# Patient Record
Sex: MALE | Race: WHITE | Employment: OTHER | URBAN - NONMETROPOLITAN AREA
[De-identification: names, ages, dates, MRNs, and addresses within clinical notes are randomized per-mention and may not be internally consistent; named-entity substitution may affect disease eponyms.]

---

## 2018-02-24 ENCOUNTER — HOSPITAL ENCOUNTER (EMERGENCY)
Age: 68
Discharge: HOME | End: 2018-02-24
Attending: NURSE PRACTITIONER
Payer: MEDICARE

## 2018-02-24 VITALS
TEMPERATURE: 98.1 F | OXYGEN SATURATION: 98 % | RESPIRATION RATE: 16 BRPM | WEIGHT: 190 LBS | BODY MASS INDEX: 24.38 KG/M2 | DIASTOLIC BLOOD PRESSURE: 82 MMHG | SYSTOLIC BLOOD PRESSURE: 172 MMHG | HEIGHT: 74 IN | HEART RATE: 63 BPM

## 2018-02-24 DIAGNOSIS — T50.Z95A VACCINE REACTION, INITIAL ENCOUNTER: Primary | ICD-10-CM

## 2018-02-24 DIAGNOSIS — L03.114 CELLULITIS OF LEFT UPPER ARM: ICD-10-CM

## 2018-02-24 PROCEDURE — 99212 OFFICE O/P EST SF 10 MIN: CPT

## 2018-02-24 PROCEDURE — 99213 OFFICE O/P EST LOW 20 MIN: CPT | Performed by: NURSE PRACTITIONER

## 2018-02-24 RX ORDER — IBUPROFEN 200 MG
600 TABLET ORAL EVERY 6 HOURS PRN
Qty: 30 TABLET | Refills: 0 | COMMUNITY
Start: 2018-02-24 | End: 2018-03-21

## 2018-02-24 RX ORDER — CEPHALEXIN 500 MG/1
500 CAPSULE ORAL 4 TIMES DAILY
Qty: 40 CAPSULE | Refills: 0 | Status: SHIPPED | OUTPATIENT
Start: 2018-02-24 | End: 2018-03-21 | Stop reason: ALTCHOICE

## 2018-02-24 ASSESSMENT — ENCOUNTER SYMPTOMS
WHEEZING: 0
SHORTNESS OF BREATH: 0
COLOR CHANGE: 1
CHEST TIGHTNESS: 0

## 2018-02-24 ASSESSMENT — PAIN DESCRIPTION - LOCATION: LOCATION: ARM

## 2018-02-24 ASSESSMENT — PAIN SCALES - GENERAL: PAINLEVEL_OUTOF10: 8

## 2018-02-24 ASSESSMENT — PAIN DESCRIPTION - ORIENTATION: ORIENTATION: LEFT

## 2018-02-24 ASSESSMENT — PAIN DESCRIPTION - DESCRIPTORS: DESCRIPTORS: DISCOMFORT;SORE

## 2018-02-24 ASSESSMENT — PAIN DESCRIPTION - FREQUENCY: FREQUENCY: CONTINUOUS

## 2018-02-24 NOTE — ED NOTES
Discharge instructions and prescriptions reviewed with pt. Pt verbalized understanding. Pt ambulated out in stable condition. Assessment unchanged upon discharge.      Roberta Cortez, RN  02/24/18 0460

## 2018-02-24 NOTE — ED PROVIDER NOTES
file.    SOCIAL HISTORY     Patient  reports that he quit smoking about 23 years ago. He has never used smokeless tobacco.    PHYSICAL EXAM     ED TRIAGE VITALS  BP: (!) 172/82, Temp: 98.1 °F (36.7 °C), Pulse: 63, Resp: 16, SpO2: 98 %  Physical Exam   Constitutional: He is oriented to person, place, and time. He appears well-developed and well-nourished. No distress. HENT:   Head: Normocephalic and atraumatic. Eyes: Conjunctivae are normal.   Neck: Neck supple. Cardiovascular: Normal rate, regular rhythm and normal heart sounds. Exam reveals no gallop and no friction rub. No murmur heard. Pulmonary/Chest: Effort normal and breath sounds normal. No respiratory distress. He has no wheezes. Lymphadenopathy:     He has no cervical adenopathy. Neurological: He is alert and oriented to person, place, and time. Skin: Skin is warm and dry. Psychiatric: He has a normal mood and affect. His behavior is normal.   Nursing note and vitals reviewed. DIAGNOSTIC RESULTS   Labs:No results found for this visit on 02/24/18. IMAGING:    URGENT CARE COURSE:     Vitals:    02/24/18 1210   BP: (!) 172/82   Pulse: 63   Resp: 16   Temp: 98.1 °F (36.7 °C)   TempSrc: Oral   SpO2: 98%   Weight: 190 lb (86.2 kg)   Height: 6' 2\" (1.88 m)       Medications - No data to display  PROCEDURES:  None  FINAL IMPRESSION      1. Vaccine reaction, initial encounter    2. Cellulitis of left upper arm        DISPOSITION/PLAN   DISPOSITION Decision To Discharge 02/24/2018 01:04:05 PM   Patient presented with possible vaccine reaction and cellulitis of the left upper arm. I explained to him I'm going to put him on an antibiotic as a precautionary measure . Cephalexin 500 mg 4 times a day ×10 days. I also recommended he take 600 mg of ibuprofen for the inflammation and swelling. Apply warm moist compresses to the area 10-15 minutes 3-4 times a day.   Follow-up with his primary care provider if symptoms aren't improving or any other concerns.     PATIENT REFERRED TO:  MD Juan Jose [address]  [phone]    Schedule an appointment as soon as possible for a visit   As needed    DISCHARGE MEDICATIONS:  Discharge Medication List as of 2/24/2018  1:08 PM      START taking these medications    Details   ibuprofen (ADVIL;MOTRIN) 200 MG tablet Take 3 tablets by mouth every 6 hours as needed for Pain, Disp-30 tablet, R-0OTC      cephALEXin (KEFLEX) 500 MG capsule Take 1 capsule by mouth 4 times daily, Disp-40 capsule, R-0Print           Discharge Medication List as of 2/24/2018  1:08 PM          Alex Burrell, [address] [facility] [address], CNP  02/24/18 2031

## 2018-03-21 RX ORDER — SIMVASTATIN 40 MG
40 TABLET ORAL DAILY
COMMUNITY

## 2018-03-21 RX ORDER — ASCORBIC ACID 500 MG
500 TABLET ORAL DAILY
COMMUNITY
End: 2022-09-13

## 2018-03-21 RX ORDER — ASPIRIN 325 MG
325 TABLET ORAL DAILY
COMMUNITY
End: 2022-09-13

## 2018-03-21 RX ORDER — IBUPROFEN 200 MG
1 CAPSULE ORAL DAILY
COMMUNITY
End: 2022-09-13

## 2018-03-28 ENCOUNTER — ANESTHESIA (OUTPATIENT)
Dept: OPERATING ROOM | Age: 68
End: 2018-03-28
Payer: MEDICARE

## 2018-03-28 ENCOUNTER — ANESTHESIA EVENT (OUTPATIENT)
Dept: OPERATING ROOM | Age: 68
End: 2018-03-28
Payer: MEDICARE

## 2018-03-28 ENCOUNTER — HOSPITAL ENCOUNTER (OUTPATIENT)
Age: 68
Setting detail: OUTPATIENT SURGERY
Discharge: HOME | End: 2018-03-28
Attending: PODIATRIST | Admitting: PODIATRIST
Payer: MEDICARE

## 2018-03-28 VITALS
RESPIRATION RATE: 1 BRPM | SYSTOLIC BLOOD PRESSURE: 99 MMHG | DIASTOLIC BLOOD PRESSURE: 57 MMHG | OXYGEN SATURATION: 96 %

## 2018-03-28 VITALS
OXYGEN SATURATION: 96 % | HEIGHT: 75 IN | BODY MASS INDEX: 23.92 KG/M2 | RESPIRATION RATE: 13 BRPM | DIASTOLIC BLOOD PRESSURE: 79 MMHG | HEART RATE: 74 BPM | WEIGHT: 192.4 LBS | SYSTOLIC BLOOD PRESSURE: 135 MMHG | TEMPERATURE: 98.3 F

## 2018-03-28 PROCEDURE — 2500000003 HC RX 250 WO HCPCS: Performed by: PODIATRIST

## 2018-03-28 PROCEDURE — 3700000000 HC ANESTHESIA ATTENDED CARE: Performed by: PODIATRIST

## 2018-03-28 PROCEDURE — 3600000014 HC SURGERY LEVEL 4 ADDTL 15MIN: Performed by: PODIATRIST

## 2018-03-28 PROCEDURE — 2500000003 HC RX 250 WO HCPCS

## 2018-03-28 PROCEDURE — C1713 ANCHOR/SCREW BN/BN,TIS/BN: HCPCS | Performed by: PODIATRIST

## 2018-03-28 PROCEDURE — 6360000002 HC RX W HCPCS: Performed by: STUDENT IN AN ORGANIZED HEALTH CARE EDUCATION/TRAINING PROGRAM

## 2018-03-28 PROCEDURE — 7100000011 HC PHASE II RECOVERY - ADDTL 15 MIN: Performed by: PODIATRIST

## 2018-03-28 PROCEDURE — 7100000000 HC PACU RECOVERY - FIRST 15 MIN: Performed by: PODIATRIST

## 2018-03-28 PROCEDURE — 6360000002 HC RX W HCPCS

## 2018-03-28 PROCEDURE — 6370000000 HC RX 637 (ALT 250 FOR IP): Performed by: STUDENT IN AN ORGANIZED HEALTH CARE EDUCATION/TRAINING PROGRAM

## 2018-03-28 PROCEDURE — 7100000010 HC PHASE II RECOVERY - FIRST 15 MIN: Performed by: PODIATRIST

## 2018-03-28 PROCEDURE — 3700000001 HC ADD 15 MINUTES (ANESTHESIA): Performed by: PODIATRIST

## 2018-03-28 PROCEDURE — 2580000003 HC RX 258: Performed by: NURSE ANESTHETIST, CERTIFIED REGISTERED

## 2018-03-28 PROCEDURE — 3600000004 HC SURGERY LEVEL 4 BASE: Performed by: PODIATRIST

## 2018-03-28 PROCEDURE — 2500000003 HC RX 250 WO HCPCS: Performed by: NURSE ANESTHETIST, CERTIFIED REGISTERED

## 2018-03-28 PROCEDURE — 6360000002 HC RX W HCPCS: Performed by: NURSE ANESTHETIST, CERTIFIED REGISTERED

## 2018-03-28 PROCEDURE — 7100000001 HC PACU RECOVERY - ADDTL 15 MIN: Performed by: PODIATRIST

## 2018-03-28 PROCEDURE — 2720000010 HC SURG SUPPLY STERILE: Performed by: PODIATRIST

## 2018-03-28 DEVICE — CANNULATED COMPRESSION SCREW
Type: IMPLANTABLE DEVICE | Site: FOOT | Status: FUNCTIONAL
Brand: TWINFIX

## 2018-03-28 DEVICE — OSTEOSYNTHESIS COMPRESSION STAPLE
Type: IMPLANTABLE DEVICE | Site: FOOT | Status: FUNCTIONAL
Brand: EASY CLIP

## 2018-03-28 DEVICE — TWIST-OFF SCREW
Type: IMPLANTABLE DEVICE | Site: FOOT | Status: FUNCTIONAL
Brand: FIXOS

## 2018-03-28 DEVICE — K WIRE FIX L229MM DIA1.6MM S STL SGL TRCR PNT STYL SMOOTH: Type: IMPLANTABLE DEVICE | Status: FUNCTIONAL

## 2018-03-28 RX ORDER — MEPERIDINE HYDROCHLORIDE 25 MG/ML
12.5 INJECTION INTRAMUSCULAR; INTRAVENOUS; SUBCUTANEOUS EVERY 5 MIN PRN
Status: DISCONTINUED | OUTPATIENT
Start: 2018-03-28 | End: 2018-03-28 | Stop reason: HOSPADM

## 2018-03-28 RX ORDER — LIDOCAINE HYDROCHLORIDE 20 MG/ML
INJECTION, SOLUTION INFILTRATION; PERINEURAL PRN
Status: DISCONTINUED | OUTPATIENT
Start: 2018-03-28 | End: 2018-03-28 | Stop reason: SDUPTHER

## 2018-03-28 RX ORDER — ONDANSETRON 2 MG/ML
4 INJECTION INTRAMUSCULAR; INTRAVENOUS EVERY 6 HOURS PRN
Status: DISCONTINUED | OUTPATIENT
Start: 2018-03-28 | End: 2018-03-28 | Stop reason: CLARIF

## 2018-03-28 RX ORDER — MIDAZOLAM HYDROCHLORIDE 1 MG/ML
INJECTION INTRAMUSCULAR; INTRAVENOUS PRN
Status: DISCONTINUED | OUTPATIENT
Start: 2018-03-28 | End: 2018-03-28 | Stop reason: SDUPTHER

## 2018-03-28 RX ORDER — GLYCOPYRROLATE 1 MG/5 ML
SYRINGE (ML) INTRAVENOUS PRN
Status: DISCONTINUED | OUTPATIENT
Start: 2018-03-28 | End: 2018-03-28 | Stop reason: SDUPTHER

## 2018-03-28 RX ORDER — ACETAMINOPHEN 325 MG/1
650 TABLET ORAL EVERY 4 HOURS PRN
Status: DISCONTINUED | OUTPATIENT
Start: 2018-03-28 | End: 2018-03-28 | Stop reason: HOSPADM

## 2018-03-28 RX ORDER — PROPOFOL 10 MG/ML
INJECTION, EMULSION INTRAVENOUS PRN
Status: DISCONTINUED | OUTPATIENT
Start: 2018-03-28 | End: 2018-03-28 | Stop reason: SDUPTHER

## 2018-03-28 RX ORDER — FENTANYL CITRATE 50 UG/ML
50 INJECTION, SOLUTION INTRAMUSCULAR; INTRAVENOUS EVERY 5 MIN PRN
Status: DISCONTINUED | OUTPATIENT
Start: 2018-03-28 | End: 2018-03-28 | Stop reason: HOSPADM

## 2018-03-28 RX ORDER — ONDANSETRON 2 MG/ML
4 INJECTION INTRAMUSCULAR; INTRAVENOUS
Status: DISCONTINUED | OUTPATIENT
Start: 2018-03-28 | End: 2018-03-28 | Stop reason: HOSPADM

## 2018-03-28 RX ORDER — BUPIVACAINE HYDROCHLORIDE AND EPINEPHRINE 5; 5 MG/ML; UG/ML
INJECTION, SOLUTION EPIDURAL; INTRACAUDAL; PERINEURAL PRN
Status: DISCONTINUED | OUTPATIENT
Start: 2018-03-28 | End: 2018-03-28 | Stop reason: HOSPADM

## 2018-03-28 RX ORDER — OXYCODONE HYDROCHLORIDE AND ACETAMINOPHEN 5; 325 MG/1; MG/1
2 TABLET ORAL EVERY 4 HOURS PRN
Status: DISCONTINUED | OUTPATIENT
Start: 2018-03-28 | End: 2018-03-28 | Stop reason: HOSPADM

## 2018-03-28 RX ORDER — OXYCODONE HYDROCHLORIDE AND ACETAMINOPHEN 5; 325 MG/1; MG/1
1 TABLET ORAL EVERY 4 HOURS PRN
Status: DISCONTINUED | OUTPATIENT
Start: 2018-03-28 | End: 2018-03-28 | Stop reason: HOSPADM

## 2018-03-28 RX ORDER — SODIUM CHLORIDE 0.9 % (FLUSH) 0.9 %
10 SYRINGE (ML) INJECTION EVERY 12 HOURS SCHEDULED
Status: DISCONTINUED | OUTPATIENT
Start: 2018-03-28 | End: 2018-03-28 | Stop reason: HOSPADM

## 2018-03-28 RX ORDER — LABETALOL HYDROCHLORIDE 5 MG/ML
5 INJECTION, SOLUTION INTRAVENOUS EVERY 10 MIN PRN
Status: DISCONTINUED | OUTPATIENT
Start: 2018-03-28 | End: 2018-03-28 | Stop reason: HOSPADM

## 2018-03-28 RX ORDER — SODIUM CHLORIDE 0.9 % (FLUSH) 0.9 %
10 SYRINGE (ML) INJECTION PRN
Status: DISCONTINUED | OUTPATIENT
Start: 2018-03-28 | End: 2018-03-28 | Stop reason: HOSPADM

## 2018-03-28 RX ORDER — SODIUM CHLORIDE 9 MG/ML
INJECTION, SOLUTION INTRAVENOUS CONTINUOUS PRN
Status: DISCONTINUED | OUTPATIENT
Start: 2018-03-28 | End: 2018-03-28 | Stop reason: SDUPTHER

## 2018-03-28 RX ORDER — ONDANSETRON 4 MG/1
4 TABLET, ORALLY DISINTEGRATING ORAL EVERY 6 HOURS PRN
Status: DISCONTINUED | OUTPATIENT
Start: 2018-03-28 | End: 2018-03-28 | Stop reason: HOSPADM

## 2018-03-28 RX ORDER — FENTANYL CITRATE 50 UG/ML
INJECTION, SOLUTION INTRAMUSCULAR; INTRAVENOUS PRN
Status: DISCONTINUED | OUTPATIENT
Start: 2018-03-28 | End: 2018-03-28 | Stop reason: SDUPTHER

## 2018-03-28 RX ADMIN — OXYCODONE HYDROCHLORIDE AND ACETAMINOPHEN 1 TABLET: 5; 325 TABLET ORAL at 13:35

## 2018-03-28 RX ADMIN — FENTANYL CITRATE 50 MCG: 50 INJECTION INTRAMUSCULAR; INTRAVENOUS at 11:22

## 2018-03-28 RX ADMIN — LIDOCAINE HYDROCHLORIDE 60 MG: 20 INJECTION, SOLUTION INFILTRATION; PERINEURAL at 11:22

## 2018-03-28 RX ADMIN — MIDAZOLAM HYDROCHLORIDE 2 MG: 1 INJECTION, SOLUTION INTRAMUSCULAR; INTRAVENOUS at 11:20

## 2018-03-28 RX ADMIN — Medication 0.2 MG: at 11:28

## 2018-03-28 RX ADMIN — PROPOFOL 150 MG: 10 INJECTION, EMULSION INTRAVENOUS at 11:22

## 2018-03-28 RX ADMIN — PHENYLEPHRINE HYDROCHLORIDE 100 MCG: 10 INJECTION INTRAVENOUS at 12:29

## 2018-03-28 RX ADMIN — CEFAZOLIN SODIUM 2 G: 2 SOLUTION INTRAVENOUS at 11:29

## 2018-03-28 RX ADMIN — OXYCODONE HYDROCHLORIDE AND ACETAMINOPHEN 1 TABLET: 5; 325 TABLET ORAL at 14:02

## 2018-03-28 RX ADMIN — PHENYLEPHRINE HYDROCHLORIDE 100 MCG: 10 INJECTION INTRAVENOUS at 12:35

## 2018-03-28 RX ADMIN — SODIUM CHLORIDE: 9 INJECTION, SOLUTION INTRAVENOUS at 11:19

## 2018-03-28 RX ADMIN — FENTANYL CITRATE 50 MCG: 50 INJECTION INTRAMUSCULAR; INTRAVENOUS at 11:42

## 2018-03-28 ASSESSMENT — PULMONARY FUNCTION TESTS
PIF_VALUE: 4
PIF_VALUE: 3
PIF_VALUE: 2
PIF_VALUE: 9
PIF_VALUE: 3
PIF_VALUE: 4
PIF_VALUE: 2
PIF_VALUE: 4
PIF_VALUE: 3
PIF_VALUE: 5
PIF_VALUE: 3
PIF_VALUE: 4
PIF_VALUE: 14
PIF_VALUE: 5
PIF_VALUE: 15
PIF_VALUE: 4
PIF_VALUE: 3
PIF_VALUE: 3
PIF_VALUE: 4
PIF_VALUE: 4
PIF_VALUE: 5
PIF_VALUE: 4
PIF_VALUE: 3
PIF_VALUE: 2
PIF_VALUE: 1
PIF_VALUE: 3
PIF_VALUE: 3
PIF_VALUE: 4
PIF_VALUE: 4
PIF_VALUE: 3
PIF_VALUE: 4
PIF_VALUE: 10
PIF_VALUE: 4
PIF_VALUE: 2
PIF_VALUE: 4
PIF_VALUE: 2
PIF_VALUE: 4
PIF_VALUE: 3
PIF_VALUE: 4
PIF_VALUE: 4
PIF_VALUE: 5
PIF_VALUE: 8
PIF_VALUE: 4
PIF_VALUE: 1
PIF_VALUE: 5
PIF_VALUE: 3
PIF_VALUE: 4
PIF_VALUE: 4
PIF_VALUE: 5
PIF_VALUE: 0
PIF_VALUE: 3
PIF_VALUE: 19
PIF_VALUE: 4
PIF_VALUE: 3
PIF_VALUE: 1
PIF_VALUE: 4
PIF_VALUE: 0
PIF_VALUE: 6
PIF_VALUE: 11
PIF_VALUE: 3
PIF_VALUE: 7
PIF_VALUE: 3
PIF_VALUE: 5
PIF_VALUE: 4
PIF_VALUE: 4
PIF_VALUE: 3
PIF_VALUE: 16
PIF_VALUE: 3
PIF_VALUE: 6
PIF_VALUE: 4
PIF_VALUE: 3

## 2018-03-28 ASSESSMENT — PAIN - FUNCTIONAL ASSESSMENT: PAIN_FUNCTIONAL_ASSESSMENT: 0-10

## 2018-03-28 ASSESSMENT — PAIN SCALES - GENERAL
PAINLEVEL_OUTOF10: 7
PAINLEVEL_OUTOF10: 0
PAINLEVEL_OUTOF10: 7

## 2018-03-28 NOTE — ANESTHESIA PRE PROCEDURE
Department of Anesthesiology  Preprocedure Note       Name:  Torres Ruelas   Age:  79 y.o.  :  1950                                          MRN:  [id number]         Date:  3/28/2018      Surgeon: Ken Greer):  Boubacar Kiran DPM    Procedure: Procedure(s):  DISTAL CHEVRON OSTEOTOMY WITH SCREW FIXATION, AKIN OSTEOTOMY, WIEL OSTEOTOMY 2nd METATARSAL WITH SHORTENING, LEFT FOOT    Medications prior to admission:   Prior to Admission medications    Medication Sig Start Date End Date Taking? Authorizing Provider   simvastatin (ZOCOR) 40 MG tablet Take 40 mg by mouth nightly   Yes Historical Provider, MD   aspirin 325 MG tablet Take 325 mg by mouth daily    Historical Provider, MD   Cholecalciferol (VITAMIN D3) 5000 units TABS Take by mouth    Historical Provider, MD   calcium citrate (CALCITRATE) 950 MG tablet Take 1 tablet by mouth daily    Historical Provider, MD   vitamin C (ASCORBIC ACID) 500 MG tablet Take 500 mg by mouth daily    Historical Provider, MD   Omega-3 Fatty Acids (OMEGA-3 FISH OIL PO) Take 2 capsules by mouth     Historical Provider, MD   Multiple Vitamins-Minerals (MULTI COMPLETE PO) Take by mouth    Historical Provider, MD       Current medications:    Current Facility-Administered Medications   Medication Dose Route Frequency Provider Last Rate Last Dose    sodium chloride flush 0.9 % injection 10 mL  10 mL Intravenous 2 times per day Carla Vasquez DPM        sodium chloride flush 0.9 % injection 10 mL  10 mL Intravenous PRN Carla Vasquez DPM        ceFAZolin (ANCEF) 2 g in dextrose 3 % 50 mL IVPB (duplex)  2 g Intravenous On Call to DENNY Velazquez DPM           Allergies:  No Known Allergies    Problem List:  There is no problem list on file for this patient.       Past Medical History:        Diagnosis Date    Hyperlipidemia        Past Surgical History:        Procedure Laterality Date    HEMORRHOID SURGERY      NOSE SURGERY      THUMB AMPUTATION      TONSILLECTOMY

## 2018-03-28 NOTE — H&P
[address] [address]  History and Physical Update    Pt Name: Evens Laird  MRN: [id number]  YOB: 1950  Date of evaluation: 3/28/2018    [x] I have examined the patient and reviewed the H&P/Consult and there are no changes to the patient or plans.     [] I have examined the patient and reviewed the H&P/Consult and have noted the following changes:        Ramses Raymundo DPM  Electronically signed 3/28/2018 at 7:37 AM

## 2018-03-28 NOTE — ANESTHESIA POSTPROCEDURE EVALUATION
Department of Anesthesiology  Postprocedure Note    Patient: Neyda Pickens  MRN: [id number]  YOB: 1950  Date of evaluation: 3/28/2018  Time:  3:53 PM     Procedure Summary     Date:  03/28/18 Room / Location:  [facility] 01 / [facility]    Anesthesia Start:  1118 Anesthesia Stop:  1301    Procedure:  DISTAL CHEVRON OSTEOTOMY WITH SCREW FIXATION, AKIN OSTEOTOMY, WIEL OSTEOTOMY 2nd METATARSAL WITH SHORTENING, LEFT FOOT (Left ) Diagnosis:  (HALLUX VALGUS, OTHER ACQUIRED DEFORMITIES UNSPECIFIED FOOT LEFT, SUBLUXATION OF  LEFT GREAT TOE)    Surgeon:  Max Bundy DPM Responsible Provider:  Javier Gregg DO    Anesthesia Type:  general ASA Status:  2          Anesthesia Type: general    Brett Phase I: Brett Score: 9    Brett Phase II: Brett Score: 10    Last vitals: Reviewed and per EMR flowsheets.        Anesthesia Post Evaluation    Patient location during evaluation: bedside  Patient participation: complete - patient participated  Level of consciousness: awake and alert  Pain score: 2  Airway patency: patent  Nausea & Vomiting: no nausea and no vomiting  Complications: no  Cardiovascular status: hemodynamically stable and blood pressure returned to baseline  Respiratory status: spontaneous ventilation, room air and acceptable  Hydration status: stable

## 2018-03-29 NOTE — OP NOTE
osteomyelitis,  overcorrection, under correction, recurrence, possible loss of limb, and  possible loss of life. OPERATIVE PROCEDURE:  The patient was transported from the preoperative  holding area to the operating room and placed in a supine position. A  pneumatic ankle tourniquet was placed on the operative limb. The foot and  ankle were prepped and draped in a normal aseptic manner. The foot was  then exsanguinated with Esmarch and a tourniquet was inflated to 250 mmHg. Distal Chevron osteotomy of the first metatarsal head. Attention was  directed towards the dorsal medial aspect of the first metatarsophalangeal  joint. A skin marker was used to make a curvilinear incision over the  area. A 15-blade was used to make a full-thickness skin incision. Following the full thickness skin incision, blunt dissection was achieved  using a Metzenbaum scissors. The joint capsule was identified undermining  along the proximal aspect of the joint capsule was achieved using  Metzenbaum scissors. Following that, attention was directed towards the  lateral aspect of the joint along the first intermetatarsal space. Blunt  dissection was used to expose the area of surgical interest.  Vale's  provided retraction. The adductor hallucis tendon was identified and  released as well as the lateral sesamoid complex. Adequate lateral release  was noted. Attention was then directed towards the medial aspect of the  first metatarsophalangeal joint capsule. A 15-blade was used to incise the  joint capsule just medial along the course of the extensor hallucis longus  tendon. The medial aspect of the first metatarsal was then carefully  exposed and resected using a 15-blade. A K-wire was then driven from  medial to lateral of the metatarsal head centrally. Using this as the  access guide, a Chevron osteotomy was performed with its apex at the head  of the metatarsal head and a 30-degree angle.   Following the completion

## 2021-06-14 ENCOUNTER — APPOINTMENT (OUTPATIENT)
Dept: INTERVENTIONAL RADIOLOGY/VASCULAR | Age: 71
End: 2021-06-14
Payer: MEDICARE

## 2021-06-14 ENCOUNTER — HOSPITAL ENCOUNTER (EMERGENCY)
Age: 71
Discharge: HOME | End: 2021-06-14
Payer: MEDICARE

## 2021-06-14 VITALS
DIASTOLIC BLOOD PRESSURE: 87 MMHG | SYSTOLIC BLOOD PRESSURE: 187 MMHG | RESPIRATION RATE: 18 BRPM | HEART RATE: 70 BPM | TEMPERATURE: 99.1 F | OXYGEN SATURATION: 99 %

## 2021-06-14 DIAGNOSIS — M79.605 LEFT LEG PAIN: Primary | ICD-10-CM

## 2021-06-14 DIAGNOSIS — I10 ELEVATED BLOOD PRESSURE READING WITH DIAGNOSIS OF HYPERTENSION: ICD-10-CM

## 2021-06-14 PROCEDURE — 99281 EMR DPT VST MAYX REQ PHY/QHP: CPT

## 2021-06-14 PROCEDURE — 93971 EXTREMITY STUDY: CPT

## 2021-06-14 ASSESSMENT — PAIN DESCRIPTION - LOCATION: LOCATION: LEG

## 2021-06-14 ASSESSMENT — PAIN SCALES - GENERAL: PAINLEVEL_OUTOF10: 7

## 2021-06-14 ASSESSMENT — PAIN DESCRIPTION - PAIN TYPE: TYPE: ACUTE PAIN

## 2021-06-14 ASSESSMENT — ENCOUNTER SYMPTOMS
RHINORRHEA: 0
CHEST TIGHTNESS: 0
BACK PAIN: 0
COUGH: 0

## 2021-06-14 ASSESSMENT — PAIN DESCRIPTION - ORIENTATION: ORIENTATION: LOWER;LEFT

## 2021-06-14 ASSESSMENT — PAIN DESCRIPTION - DESCRIPTORS: DESCRIPTORS: DULL

## 2021-06-14 NOTE — ED PROVIDER NOTES
[address] [place] [address]       Chief Complaint   Patient presents with    Leg Pain    Leg Swelling       Nurses Notes reviewed and I agree except as noted in the HPI. HISTORY OF PRESENT ILLNESS    Saravanan Goff gini 73 y.o. male who presents to the ED for evaluation of left leg pain and swelling. Patient reports PCP sent him here to rule out DVT. Patient reports pain and swelling to the posterior medial left lower leg that began about 2 weeks ago while he was walking. He denies injury or new activity. Patient reports the pain is worse with walking, but after walking for a while, the pain improves. He walks 5- 10 miles daily. He rates the pain as 2/10 sitting and 7.5/10 when walking. Patient denies radiation of pain. Patient reports using Bengay and ice with no relief. Patient denies these symptoms previously history of blood clots, family history of blood blots, or smoking. Patient has no other complaints at this time. Pain description:  Onset: 2 weeks  Location: left lower leg  Duration: constant  Character: aching, pressure  Aggravating factors: worse with walking  Radiation: none  Timing: none  Severity: 2/10 at rest 7.5/10 walking    Experienced previously: no    HPI was provided by the patient    REVIEW OF SYSTEMS     Review of Systems   Constitutional: Negative for chills, fatigue and fever. HENT: Negative for congestion, ear discharge, ear pain, postnasal drip and rhinorrhea. Eyes: Negative for pain. Respiratory: Negative for cough and chest tightness. Cardiovascular: Positive for leg swelling (left lower). Gastrointestinal: Negative for nausea and vomiting. Genitourinary: Negative for difficulty urinating, dysuria, enuresis, flank pain and hematuria. Musculoskeletal: Negative for back pain and joint swelling. Skin: Positive for wound. Negative for rash. Neurological: Negative for dizziness, light-headedness, numbness and headaches. Psychiatric/Behavioral: Negative for agitation, behavioral problems and confusion. All other systems negative except as noted. PAST MEDICAL HISTORY     Past Medical History:   Diagnosis Date    Hyperlipidemia        SURGICALHISTORY      has a past surgical history that includes Hemorrhoid surgery; Tonsillectomy; Nose surgery; Thumb amputation; Foot surgery (Left, 2018); and pr office/outpt visit,procedure only (Left, 3/28/2018). CURRENT MEDICATIONS       Discharge Medication List as of 2021  8:01 PM      CONTINUE these medications which have NOT CHANGED    Details   aspirin 325 MG tablet Take 325 mg by mouth dailyHistorical Med      simvastatin (ZOCOR) 40 MG tablet Take 40 mg by mouth nightlyHistorical Med      Cholecalciferol (VITAMIN D3) 5000 units TABS Take by mouthHistorical Med      calcium citrate (CALCITRATE) 950 MG tablet Take 1 tablet by mouth dailyHistorical Med      vitamin C (ASCORBIC ACID) 500 MG tablet Take 500 mg by mouth dailyHistorical Med      Omega-3 Fatty Acids (OMEGA-3 FISH OIL PO) Take 2 capsules by mouth Historical Med      Multiple Vitamins-Minerals (MULTI COMPLETE PO) Take by mouthHistorical Med             ALLERGIES     has No Known Allergies. FAMILY HISTORY     He indicated that his mother is alive. He indicated that his father is . He indicated that his brother is . family history includes Early Death (age of onset: 39) in his brother; Early Death (age of onset: 62) in his father; Heart Attack in his brother and father; Stroke in his mother.     SOCIAL HISTORY       Social History     Socioeconomic History    Marital status:      Spouse name: Not on file    Number of children: Not on file    Years of education: Not on file    Highest education level: Not on file   Occupational History    Not on file   Tobacco Use    Smoking status: Former Smoker     Quit date: 1994     Years since quittin.9    Smokeless tobacco: Never Used   Substance and Sexual Activity    Alcohol use: Yes     Alcohol/week: 3.0 standard drinks     Types: 3 Glasses of wine per week    Drug use: Not on file    Sexual activity: Not on file   Other Topics Concern    Not on file   Social History Narrative    Not on file     Social Determinants of Health     Financial Resource Strain:     Difficulty of Paying Living Expenses:    Food Insecurity:     Worried About Running Out of Food in the Last Year:     [address] in the Last Year:    Transportation Needs:     Lack of Transportation (Medical):  Lack of Transportation (Non-Medical):    Physical Activity:     Days of Exercise per Week:     Minutes of Exercise per Session:    Stress:     Feeling of Stress :    Social Connections:     Frequency of Communication with Friends and Family:     Frequency of Social Gatherings with Friends and Family:     Attends Synagogue Services:     Active Member of Clubs or Organizations:     Attends Club or Organization Meetings:     Marital Status:    Intimate Partner Violence:     Fear of Current or Ex-Partner:     Emotionally Abused:     Physically Abused:     Sexually Abused:        PHYSICAL EXAM     INITIAL VITALS:  oral temperature is 99.1 °F (37.3 °C). His blood pressure is 187/87 (abnormal) and his pulse is 70. His respiration is 18 and oxygen saturation is 99%. Physical Exam  Vitals and nursing note reviewed. Constitutional:       General: He is not in acute distress. Appearance: He is well-developed. He is not diaphoretic. HENT:      Head: Normocephalic and atraumatic. Nose: Nose normal.      Mouth/Throat:      Mouth: Mucous membranes are moist.      Pharynx: Oropharynx is clear. Eyes:      General:         Right eye: No discharge. Left eye: No discharge. Conjunctiva/sclera: Conjunctivae normal.   Neck:      Trachea: No tracheal deviation. Cardiovascular:      Rate and Rhythm: Normal rate and regular rhythm. Heart sounds: Normal heart sounds. No murmur heard. No gallop. Comments: Normal capillary refill  Pulmonary:      Effort: Pulmonary effort is normal. No respiratory distress. Breath sounds: Normal breath sounds. No stridor. Abdominal:      General: Bowel sounds are normal.      Palpations: Abdomen is soft. Musculoskeletal:         General: No deformity. Normal range of motion. Cervical back: Normal range of motion. Right lower leg: Normal.      Left lower leg: Swelling (with nodule to medial aspect. No erythema) and tenderness (medial/ posterior) present. No deformity, lacerations or bony tenderness. No edema. Skin:     General: Skin is warm and dry. Capillary Refill: Capillary refill takes less than 2 seconds. Coloration: Skin is not pale. Findings: No erythema or rash. Neurological:      General: No focal deficit present. Mental Status: He is alert and oriented to person, place, and time. Cranial Nerves: No cranial nerve deficit. Psychiatric:         Mood and Affect: Mood normal.         Behavior: Behavior normal.         DIFFERENTIAL DIAGNOSIS:   DVT, superficial thrombophlebitis, phlebitis, strain    DIAGNOSTIC RESULTS     EKG: All EKG's are interpreted by the Emergency Department Physician who eithersigns or Co-signs this chart in the absence of a cardiologist.    None    RADIOLOGY: non-plainfilm images(s) such as CT, Ultrasound and MRI are read by the radiologist.  Plain radiographic images are visualized and preliminarily interpreted by the emergency physician unless otherwise stated below. VL DUP LOWER EXTREMITY VENOUS LEFT   Final Result   Impression:   No DVT within the veins of the left lower extremity. This document has been electronically signed by: Fior Franklin MD on    06/14/2021 07:26 PM      Technique Used: Duplex examination performed utilizing grayscale, color    and spectral analysis.             LABS:   Labs Reviewed - No data to display    EMERGENCY DEPARTMENT COURSE:   Vitals:    Vitals:    06/14/21 1800 06/14/21 1803 06/14/21 2001   BP:  (!) 189/90 (!) 187/87   Pulse: 70     Resp: 18     Temp: 99.1 °F (37.3 °C)     TempSrc: Oral     SpO2: 99%         [facility]      Patient was seen and evaluated in the emergency department, patient appeared to be in stable condition. Vital signs assessed, no abnormality noted. Physical exam completed. Tenderness to the posterior. Medial left lower leg with swelling and palpable nodule noted. VL DUP Ordered. Based on my physical exam and work up I believe the patient has leg pain not due to DVT. I discussed my findings and plan of care with patient. They are amenable with discharge home. While here in the emergency department they maintained a stable course and were appropriate for discharge. Medications - No data to display      Patient was seen independently by myself. The patient's final impression and disposition and plan was determined by myself. Strict return precautions and follow up instructions were discussed with the patient prior to discharge, with which the patient agrees. Physical assessment findings, diagnostic testing(s) if applicable, and vital signs reviewed with patient/patient representative. Questions answered. Medications asdirected, including OTC medications for supportive care. Education provided on medications. Differential diagnosis(s) discussed with patient/patient representative. Home care/self care instructions reviewed withpatient/patient representative. Patient is to follow-up with family care provider in 2-3 days if no improvement. Patient is to go to the emergency department if symptoms worsen. Patient/patient representative isaware of care plan, questions answered, verbalizes understanding and is in agreement. CRITICAL CARE:   None    CONSULTS:  None    PROCEDURES:  None    FINAL IMPRESSION     1. Left leg pain    2.

## 2021-06-14 NOTE — ED NOTES
Pt comes in through [facility]. 2 weeks ago while walking he began experiencing left calf pain. He then started having swelling in the area.  He states if he is sitting the pain is 3/10, but when walking it is 7/10     Em Proctor RN  06/14/21 3662

## 2021-06-24 ASSESSMENT — ENCOUNTER SYMPTOMS
VOMITING: 0
NAUSEA: 0
EYE PAIN: 0

## 2022-07-02 ENCOUNTER — HOSPITAL ENCOUNTER (EMERGENCY)
Age: 72
Discharge: HOME | End: 2022-07-02
Payer: MEDICARE

## 2022-07-02 ENCOUNTER — APPOINTMENT (OUTPATIENT)
Dept: GENERAL RADIOLOGY | Age: 72
End: 2022-07-02
Payer: MEDICARE

## 2022-07-02 VITALS
WEIGHT: 195 LBS | TEMPERATURE: 96.9 F | HEIGHT: 74 IN | DIASTOLIC BLOOD PRESSURE: 73 MMHG | RESPIRATION RATE: 18 BRPM | BODY MASS INDEX: 25.03 KG/M2 | HEART RATE: 71 BPM | SYSTOLIC BLOOD PRESSURE: 156 MMHG | OXYGEN SATURATION: 95 %

## 2022-07-02 DIAGNOSIS — M25.571 ACUTE RIGHT ANKLE PAIN: Primary | ICD-10-CM

## 2022-07-02 PROCEDURE — 99213 OFFICE O/P EST LOW 20 MIN: CPT | Performed by: NURSE PRACTITIONER

## 2022-07-02 PROCEDURE — 73610 X-RAY EXAM OF ANKLE: CPT

## 2022-07-02 PROCEDURE — 99213 OFFICE O/P EST LOW 20 MIN: CPT

## 2022-07-02 RX ORDER — LISINOPRIL 10 MG/1
10 TABLET ORAL DAILY
COMMUNITY

## 2022-07-02 RX ORDER — ACETAMINOPHEN 325 MG/1
650 TABLET ORAL EVERY 6 HOURS PRN
Qty: 120 TABLET | Refills: 3 | COMMUNITY
Start: 2022-07-02

## 2022-07-02 ASSESSMENT — PAIN DESCRIPTION - ORIENTATION: ORIENTATION: RIGHT

## 2022-07-02 ASSESSMENT — PAIN SCALES - GENERAL: PAINLEVEL_OUTOF10: 3

## 2022-07-02 ASSESSMENT — PAIN DESCRIPTION - LOCATION: LOCATION: ANKLE

## 2022-07-02 ASSESSMENT — PAIN DESCRIPTION - FREQUENCY: FREQUENCY: INTERMITTENT

## 2022-07-02 ASSESSMENT — PAIN DESCRIPTION - PAIN TYPE: TYPE: ACUTE PAIN

## 2022-07-02 ASSESSMENT — PAIN DESCRIPTION - ONSET: ONSET: SUDDEN

## 2022-07-02 ASSESSMENT — PAIN - FUNCTIONAL ASSESSMENT
PAIN_FUNCTIONAL_ASSESSMENT: 0-10
PAIN_FUNCTIONAL_ASSESSMENT: PREVENTS OR INTERFERES SOME ACTIVE ACTIVITIES AND ADLS

## 2022-07-02 ASSESSMENT — PAIN DESCRIPTION - DESCRIPTORS: DESCRIPTORS: STABBING

## 2022-07-02 NOTE — ED NOTES
Pt's right ankle wrapped in 4\" ace bandage. No change in patients condition. Discharge instructions and OTC pain medications discussed with pt. Pt. Verbalized understanding of info given and ambulated to exit in stable condition.       Gael Colon RN  07/02/22 7545
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
## 2022-09-06 ENCOUNTER — HOSPITAL ENCOUNTER (OUTPATIENT)
Age: 72
Discharge: HOME | End: 2022-09-06
Payer: MEDICARE

## 2022-09-06 LAB
EKG ATRIAL RATE: 54 BPM
EKG P AXIS: 54 DEGREES
EKG P-R INTERVAL: 176 MS
EKG Q-T INTERVAL: 398 MS
EKG QRS DURATION: 108 MS
EKG QTC CALCULATION (BAZETT): 377 MS
EKG R AXIS: -51 DEGREES
EKG T AXIS: 36 DEGREES
EKG VENTRICULAR RATE: 54 BPM
ERYTHROCYTE [DISTWIDTH] IN BLOOD BY AUTOMATED COUNT: 14 % (ref 11.5–14.5)
ERYTHROCYTE [DISTWIDTH] IN BLOOD BY AUTOMATED COUNT: 47.8 FL (ref 35–45)
HCT VFR BLD CALC: 50.7 % (ref 42–52)
HEMOGLOBIN: 16.2 GM/DL (ref 14–18)
MCH RBC QN AUTO: 29.6 PG (ref 26–33)
MCHC RBC AUTO-ENTMCNC: 32 GM/DL (ref 32.2–35.5)
MCV RBC AUTO: 92.7 FL (ref 80–94)
PLATELET # BLD: 191 THOU/MM3 (ref 130–400)
PMV BLD AUTO: 10.2 FL (ref 9.4–12.4)
RBC # BLD: 5.47 MILL/MM3 (ref 4.7–6.1)
WBC # BLD: 6 THOU/MM3 (ref 4.8–10.8)

## 2022-09-06 PROCEDURE — 93010 ELECTROCARDIOGRAM REPORT: CPT | Performed by: INTERNAL MEDICINE

## 2022-09-06 PROCEDURE — 85027 COMPLETE CBC AUTOMATED: CPT

## 2022-09-06 PROCEDURE — 80053 COMPREHEN METABOLIC PANEL: CPT

## 2022-09-06 PROCEDURE — 36415 COLL VENOUS BLD VENIPUNCTURE: CPT

## 2022-09-06 PROCEDURE — 93005 ELECTROCARDIOGRAM TRACING: CPT | Performed by: PODIATRIST

## 2022-09-07 LAB
ALBUMIN SERPL-MCNC: 4.5 G/DL (ref 3.5–5.1)
ALP BLD-CCNC: 55 U/L (ref 38–126)
ALT SERPL-CCNC: 19 U/L (ref 11–66)
ANION GAP SERPL CALCULATED.3IONS-SCNC: 10 MEQ/L (ref 8–16)
AST SERPL-CCNC: 20 U/L (ref 5–40)
BILIRUB SERPL-MCNC: 0.9 MG/DL (ref 0.3–1.2)
BUN BLDV-MCNC: 25 MG/DL (ref 7–22)
CALCIUM SERPL-MCNC: 9.3 MG/DL (ref 8.5–10.5)
CHLORIDE BLD-SCNC: 104 MEQ/L (ref 98–111)
CO2: 26 MEQ/L (ref 23–33)
CREAT SERPL-MCNC: 1.4 MG/DL (ref 0.4–1.2)
GFR SERPL CREATININE-BSD FRML MDRD: 50 ML/MIN/1.73M2
GLUCOSE BLD-MCNC: 88 MG/DL (ref 70–108)
POTASSIUM SERPL-SCNC: 5.1 MEQ/L (ref 3.5–5.2)
SODIUM BLD-SCNC: 140 MEQ/L (ref 135–145)
TOTAL PROTEIN: 6.6 G/DL (ref 6.1–8)

## 2022-09-08 NOTE — PROGRESS NOTES
EKG 9/6/22 given to anesthesia for review.  Per Dr. Macrina Cardoso ok to proceed at the surgery center 9/21 without further intervention

## 2022-09-13 RX ORDER — ASPIRIN 81 MG/1
81 TABLET ORAL DAILY
COMMUNITY

## 2022-09-13 RX ORDER — VITAMIN B COMPLEX
1 CAPSULE ORAL DAILY
COMMUNITY

## 2022-09-21 ENCOUNTER — ANESTHESIA (OUTPATIENT)
Dept: OPERATING ROOM | Age: 72
End: 2022-09-21
Payer: MEDICARE

## 2022-09-21 ENCOUNTER — ANESTHESIA EVENT (OUTPATIENT)
Dept: OPERATING ROOM | Age: 72
End: 2022-09-21
Payer: MEDICARE

## 2022-09-21 ENCOUNTER — HOSPITAL ENCOUNTER (OUTPATIENT)
Age: 72
Setting detail: OUTPATIENT SURGERY
Discharge: HOME | End: 2022-09-21
Attending: PODIATRIST | Admitting: PODIATRIST
Payer: MEDICARE

## 2022-09-21 VITALS
BODY MASS INDEX: 24.25 KG/M2 | HEART RATE: 60 BPM | OXYGEN SATURATION: 95 % | WEIGHT: 195 LBS | SYSTOLIC BLOOD PRESSURE: 122 MMHG | RESPIRATION RATE: 16 BRPM | DIASTOLIC BLOOD PRESSURE: 56 MMHG | HEIGHT: 75 IN | TEMPERATURE: 96.5 F

## 2022-09-21 PROCEDURE — 2720000010 HC SURG SUPPLY STERILE: Performed by: PODIATRIST

## 2022-09-21 PROCEDURE — 7100000000 HC PACU RECOVERY - FIRST 15 MIN: Performed by: PODIATRIST

## 2022-09-21 PROCEDURE — 3600000003 HC SURGERY LEVEL 3 BASE: Performed by: PODIATRIST

## 2022-09-21 PROCEDURE — C1713 ANCHOR/SCREW BN/BN,TIS/BN: HCPCS | Performed by: PODIATRIST

## 2022-09-21 PROCEDURE — 7100000011 HC PHASE II RECOVERY - ADDTL 15 MIN: Performed by: PODIATRIST

## 2022-09-21 PROCEDURE — 6360000002 HC RX W HCPCS: Performed by: NURSE ANESTHETIST, CERTIFIED REGISTERED

## 2022-09-21 PROCEDURE — 2709999900 HC NON-CHARGEABLE SUPPLY: Performed by: PODIATRIST

## 2022-09-21 PROCEDURE — 3700000000 HC ANESTHESIA ATTENDED CARE: Performed by: PODIATRIST

## 2022-09-21 PROCEDURE — 2500000003 HC RX 250 WO HCPCS: Performed by: NURSE ANESTHETIST, CERTIFIED REGISTERED

## 2022-09-21 PROCEDURE — 3600000013 HC SURGERY LEVEL 3 ADDTL 15MIN: Performed by: PODIATRIST

## 2022-09-21 PROCEDURE — 7100000001 HC PACU RECOVERY - ADDTL 15 MIN: Performed by: PODIATRIST

## 2022-09-21 PROCEDURE — 2500000003 HC RX 250 WO HCPCS: Performed by: PODIATRIST

## 2022-09-21 PROCEDURE — 6370000000 HC RX 637 (ALT 250 FOR IP): Performed by: PODIATRIST

## 2022-09-21 PROCEDURE — 2580000003 HC RX 258: Performed by: NURSE ANESTHETIST, CERTIFIED REGISTERED

## 2022-09-21 PROCEDURE — 6360000002 HC RX W HCPCS: Performed by: ANESTHESIOLOGY

## 2022-09-21 PROCEDURE — 3700000001 HC ADD 15 MINUTES (ANESTHESIA): Performed by: PODIATRIST

## 2022-09-21 PROCEDURE — 7100000010 HC PHASE II RECOVERY - FIRST 15 MIN: Performed by: PODIATRIST

## 2022-09-21 DEVICE — STAPLE INT W11XH10MM WIRE 1.5X1.5MM HND WRST NIT SPD CONT: Type: IMPLANTABLE DEVICE | Site: FOOT | Status: FUNCTIONAL

## 2022-09-21 DEVICE — KIT DRL PIN L100MM DIA2MM ABSRB W/ MTL TIP K WIRE GUID SL: Type: IMPLANTABLE DEVICE | Site: FOOT | Status: FUNCTIONAL

## 2022-09-21 RX ORDER — LIDOCAINE HYDROCHLORIDE 20 MG/ML
INJECTION, SOLUTION EPIDURAL; INFILTRATION; INTRACAUDAL; PERINEURAL PRN
Status: DISCONTINUED | OUTPATIENT
Start: 2022-09-21 | End: 2022-09-21 | Stop reason: SDUPTHER

## 2022-09-21 RX ORDER — SODIUM CHLORIDE 0.9 % (FLUSH) 0.9 %
5-40 SYRINGE (ML) INJECTION EVERY 12 HOURS SCHEDULED
Status: DISCONTINUED | OUTPATIENT
Start: 2022-09-21 | End: 2022-09-21 | Stop reason: SDUPTHER

## 2022-09-21 RX ORDER — SODIUM CHLORIDE 0.9 % (FLUSH) 0.9 %
5-40 SYRINGE (ML) INJECTION PRN
Status: DISCONTINUED | OUTPATIENT
Start: 2022-09-21 | End: 2022-09-21 | Stop reason: HOSPADM

## 2022-09-21 RX ORDER — BUPIVACAINE HYDROCHLORIDE 2.5 MG/ML
INJECTION, SOLUTION EPIDURAL; INFILTRATION; INTRACAUDAL PRN
Status: DISCONTINUED | OUTPATIENT
Start: 2022-09-21 | End: 2022-09-21 | Stop reason: ALTCHOICE

## 2022-09-21 RX ORDER — SODIUM CHLORIDE 9 MG/ML
INJECTION, SOLUTION INTRAVENOUS PRN
Status: DISCONTINUED | OUTPATIENT
Start: 2022-09-21 | End: 2022-09-21 | Stop reason: HOSPADM

## 2022-09-21 RX ORDER — SODIUM CHLORIDE 9 MG/ML
INJECTION, SOLUTION INTRAVENOUS CONTINUOUS
Status: DISCONTINUED | OUTPATIENT
Start: 2022-09-21 | End: 2022-09-21 | Stop reason: HOSPADM

## 2022-09-21 RX ORDER — CEFAZOLIN SODIUM 1 G/3ML
INJECTION, POWDER, FOR SOLUTION INTRAMUSCULAR; INTRAVENOUS PRN
Status: DISCONTINUED | OUTPATIENT
Start: 2022-09-21 | End: 2022-09-21 | Stop reason: SDUPTHER

## 2022-09-21 RX ORDER — PROPOFOL 10 MG/ML
INJECTION, EMULSION INTRAVENOUS PRN
Status: DISCONTINUED | OUTPATIENT
Start: 2022-09-21 | End: 2022-09-21 | Stop reason: SDUPTHER

## 2022-09-21 RX ORDER — LIDOCAINE HYDROCHLORIDE AND EPINEPHRINE BITARTRATE 20; .01 MG/ML; MG/ML
INJECTION, SOLUTION SUBCUTANEOUS PRN
Status: DISCONTINUED | OUTPATIENT
Start: 2022-09-21 | End: 2022-09-21 | Stop reason: ALTCHOICE

## 2022-09-21 RX ORDER — EPHEDRINE SULFATE 50 MG/ML
INJECTION INTRAVENOUS PRN
Status: DISCONTINUED | OUTPATIENT
Start: 2022-09-21 | End: 2022-09-21 | Stop reason: SDUPTHER

## 2022-09-21 RX ORDER — DEXAMETHASONE SODIUM PHOSPHATE 10 MG/ML
INJECTION, EMULSION INTRAMUSCULAR; INTRAVENOUS PRN
Status: DISCONTINUED | OUTPATIENT
Start: 2022-09-21 | End: 2022-09-21 | Stop reason: SDUPTHER

## 2022-09-21 RX ORDER — SODIUM CHLORIDE 0.9 % (FLUSH) 0.9 %
5-40 SYRINGE (ML) INJECTION EVERY 12 HOURS SCHEDULED
Status: DISCONTINUED | OUTPATIENT
Start: 2022-09-21 | End: 2022-09-21 | Stop reason: HOSPADM

## 2022-09-21 RX ORDER — OXYCODONE HYDROCHLORIDE 5 MG/1
10 TABLET ORAL EVERY 4 HOURS PRN
Status: DISCONTINUED | OUTPATIENT
Start: 2022-09-21 | End: 2022-09-21 | Stop reason: HOSPADM

## 2022-09-21 RX ORDER — SODIUM CHLORIDE 9 MG/ML
INJECTION, SOLUTION INTRAVENOUS CONTINUOUS PRN
Status: DISCONTINUED | OUTPATIENT
Start: 2022-09-21 | End: 2022-09-21 | Stop reason: SDUPTHER

## 2022-09-21 RX ORDER — MORPHINE SULFATE 2 MG/ML
2 INJECTION, SOLUTION INTRAMUSCULAR; INTRAVENOUS
Status: DISCONTINUED | OUTPATIENT
Start: 2022-09-21 | End: 2022-09-21 | Stop reason: HOSPADM

## 2022-09-21 RX ORDER — OXYCODONE HYDROCHLORIDE 5 MG/1
5 TABLET ORAL EVERY 4 HOURS PRN
Status: DISCONTINUED | OUTPATIENT
Start: 2022-09-21 | End: 2022-09-21 | Stop reason: HOSPADM

## 2022-09-21 RX ORDER — FENTANYL CITRATE 50 UG/ML
INJECTION, SOLUTION INTRAMUSCULAR; INTRAVENOUS PRN
Status: DISCONTINUED | OUTPATIENT
Start: 2022-09-21 | End: 2022-09-21 | Stop reason: SDUPTHER

## 2022-09-21 RX ORDER — MORPHINE SULFATE 2 MG/ML
4 INJECTION, SOLUTION INTRAMUSCULAR; INTRAVENOUS
Status: DISCONTINUED | OUTPATIENT
Start: 2022-09-21 | End: 2022-09-21 | Stop reason: HOSPADM

## 2022-09-21 RX ORDER — ONDANSETRON 2 MG/ML
INJECTION INTRAMUSCULAR; INTRAVENOUS PRN
Status: DISCONTINUED | OUTPATIENT
Start: 2022-09-21 | End: 2022-09-21 | Stop reason: SDUPTHER

## 2022-09-21 RX ORDER — SODIUM CHLORIDE 0.9 % (FLUSH) 0.9 %
5-40 SYRINGE (ML) INJECTION PRN
Status: DISCONTINUED | OUTPATIENT
Start: 2022-09-21 | End: 2022-09-21 | Stop reason: SDUPTHER

## 2022-09-21 RX ADMIN — ONDANSETRON 4 MG: 2 INJECTION INTRAMUSCULAR; INTRAVENOUS at 11:30

## 2022-09-21 RX ADMIN — EPHEDRINE SULFATE 15 MG: 50 INJECTION, SOLUTION INTRAVENOUS at 10:25

## 2022-09-21 RX ADMIN — CEFAZOLIN 2000 MG: 1 INJECTION, POWDER, FOR SOLUTION INTRAMUSCULAR; INTRAVENOUS at 09:54

## 2022-09-21 RX ADMIN — PROPOFOL 150 MG: 10 INJECTION, EMULSION INTRAVENOUS at 09:51

## 2022-09-21 RX ADMIN — FENTANYL CITRATE 100 MCG: 50 INJECTION, SOLUTION INTRAMUSCULAR; INTRAVENOUS at 09:51

## 2022-09-21 RX ADMIN — SODIUM CHLORIDE: 9 INJECTION, SOLUTION INTRAVENOUS at 09:48

## 2022-09-21 RX ADMIN — LIDOCAINE HYDROCHLORIDE 80 MG: 20 INJECTION, SOLUTION EPIDURAL; INFILTRATION; INTRACAUDAL; PERINEURAL at 09:51

## 2022-09-21 RX ADMIN — EPHEDRINE SULFATE 15 MG: 50 INJECTION, SOLUTION INTRAVENOUS at 10:01

## 2022-09-21 RX ADMIN — OXYCODONE 5 MG: 5 TABLET ORAL at 12:17

## 2022-09-21 RX ADMIN — DEXAMETHASONE SODIUM PHOSPHATE 8 MG: 10 INJECTION, EMULSION INTRAMUSCULAR; INTRAVENOUS at 09:55

## 2022-09-21 ASSESSMENT — PAIN SCALES - GENERAL
PAINLEVEL_OUTOF10: 5
PAINLEVEL_OUTOF10: 5

## 2022-09-21 ASSESSMENT — PAIN DESCRIPTION - ORIENTATION: ORIENTATION: LEFT

## 2022-09-21 ASSESSMENT — PAIN - FUNCTIONAL ASSESSMENT: PAIN_FUNCTIONAL_ASSESSMENT: 0-10

## 2022-09-21 ASSESSMENT — PAIN DESCRIPTION - LOCATION: LOCATION: FOOT

## 2022-09-21 ASSESSMENT — PAIN DESCRIPTION - DESCRIPTORS: DESCRIPTORS: SHARP

## 2022-09-21 NOTE — DISCHARGE INSTRUCTIONS
Post Op Instructions    Pt Name: [facility] Record Number: [id number]  Today's Date: 9/21/2022    GENERAL ANESTHESIA OR SEDATION  1. Do not drive or operate hazardous machinery for 24 hours. 2. Do not make important business or personal decisions for 24 hours. 3. Do not drink alcoholic beverages or use tobacco for 24 hours. ACTIVITY INSTRUCTIONS:  [] Rest today. Resume light to normal activity tomorrow. [x] You may ambulate as tolerated in your post op shoe/boot. [] No weight is to be placed on the operative limb. Use crutches, walker, Roll-a-bout as needed. [x]Elevate the operative limb as much as possible to reduce swelling and discomfort for the first 72 hours      DIET INSTRUCTIONS:  []Begin with clear liquids. If not nauseated, may increase to a low-fat diet when you desire. [x]Regular diet as tolerated. []Other:     MEDICATIONS  [x]Prescription sent with you to be used as directed. Do not drink alcohol or drive while taking these medications. You may experience dizziness or drowsiness with these medications. You may also experience constipation which can be relieved with stool softners or laxatives. [x]You may resume your daily prescription medication schedule unless otherwise specified. [x]If taking 325mg Aspirin or other blood thinners such as Coumadin or Plavix, you may resume tomorrow, unless told otherwise. WOUND/DRESSING INSTRUCTIONS:  Always ensure you and your care giver clean hands before and after caring for the wound. [x] Keep dressing clean and dry until you are seen in the offfice      [x] Ice operative site for 20 minutes 4 times a day. - you may apply ice bag behind the knee or directly on the foot/ankle bandage.   - do not apply ice directly to the skin  [x] You may loosen the ACE wrap if it feels too tight, but do not disturb the underlying white gauze wrap. FOLLOW-UP CARE.  SPECIFICALLY WATCH FOR:   Fever over 101 degrees by mouth   Increased redness, warmth, hardness at operative site. Blood soaked dressing (small amounts of oozing may be normal.)   Increased or progressive drainage from the surgical area   Inability to urinate or blood in the urine   Pain not relieved by the medications ordered   Persistent nausea and/or vomiting, unable to retain fluids. Swelling, increased redness, warmth, or hardness around operative area   Numb, tingling or cold toes   Toe(s) become white or bluish    FOLLOW-UP APPOINTMENT   []1 week   []2 weeks   [x]Other, As prevously scheduled     Call my office if you have any problem that concerns you [phone]. After hours, you can reach the answering service via the office phone number. IF YOU NEED IMMEDIATE ATTENTION, GO TO THE EMERGENCY ROOM AND YOUR DOCTOR WILL BE CONTACTED.       JOS Ovalle DPM, PGY-2  Podiatric Surgical Resident  9/21/2022  11:49 AM

## 2022-09-21 NOTE — PROGRESS NOTES
1153 Patient arrived to PACU via cart. Spontaneous respiraitons even and unlabored. Placed on monitor--VSS. Report received from [address] [place] [address] Assessment completed. Patient is alert and oriented x4. IV infusing at [facility]-- no complications. Patient denies pain--will monitor. Surgical site clean and dry. 1159 Pt. Starting to wake slowly. Pt. States pain 4/10.  1204 Pt. Returns to sleep. RN remains at bedside. [address] to anterior foot  1209 Pt. States pain 5/10 and requesting pain medication. RN discussed options. Pt. Agrees to Percocet. RN educated the importance of not taking Percocet on an empty stomach. Pt. Agrees to eating a few crackers prior to.  1211 Water and crackers provided. 1217 PRN pain medication given per order. See MAR.  1225 Pt. Resting with eyes closed. Pt. Denies needs. RN remains at bedside. 1228 Ice pack removed. 1231 PACU care complete. 1232 Pt. Repositioned in bed  1233 Pt. Transitioned to Phase II and transferred to [facility] access hospital.  1235 Family brought to bedside. Snack and drink provided. 1238 Pt. Denies all other needs. Side rails up X2. Call light left within reach. Family remains at bedside. [address] at bedside. Pt. States pain has improved to 3/10. And denies needs. 1315 Pt. Tolerating oral intake well and without complaints of nausea. [address] at bedside. Pt. States pain is tolerable and rates it 2/10 . Pt. Denies all other needs at this time, and states readiness to be discharged. 1334 INT removed. No complications noted. 1338 Pt. Standing at bedside and tolerates activity well. 1340 Family getting pt. Dressed. 1350 Discharge instructions reviewed with the pt. And S.O. All questions addressed. AVS given to spouse. 46 Pt.'s wife left to get private vehicle. 1358 Pt. Taken to private vehicle in stable condition via wheelchair.

## 2022-09-21 NOTE — BRIEF OP NOTE
Brief Postoperative Note      Patient: Lakia Bradford  YOB: 1950  MRN: [id number]    Date of Procedure: 9/21/2022    Pre-Op Diagnosis: Hallux valgus (acquired), left foot [M20.12]  Contracture, left foot [M24.575]  Hammer toe of left foot [M20.42]    Post-Op Diagnosis: Same       Procedure(s):  AKIN OSTEOTOMY LEFT HALLUX WITH STAPLE FIXATION, CAPSULOTOMY 2ND MPJ WITH TENOTOMY 4TH AND 5TH DIGIT, EXOSTECTOMY 3ND METATARSAL, ARTHRODESIS DIPJ AND PIPJ 2ND DIGIT, ARTHRODESIS DIPJ AND PIPJ 3RD  TOE, ALL ON THE LEFT    Surgeon(s):  Boubacar Singer DPM    Assistant:  Resident: Demetri Billings DPM; Jennifer Gallego DPM    Anesthesia: General    Estimated Blood Loss (mL): Minimal    Complications: None    Specimens:   * No specimens in log *    Implants:  Implant Name Type Inv. Item Serial No.  Lot No. LRB No. Used Action   KIT DRL PIN L100MM DIA2MM ABSRB W/ MTL Jonel Porteous  - [id number]  KIT DRL PIN L100MM DIA2MM ABSRB W/ MTL TIP Amilcar Hernandezmon   ARTHREX [facility] [id number] Left 1 Implanted   STAPLE INT [id number] WIRE 1.5X1.5MM HND WRST NIT SPD CONT - [id number]  STAPLE INT [id number] WIRE 1.5X1.5MM HND WRST NIT SPD CONT  DEPUY SYNTHES [place] [id number] Left 1 Implanted   KIT DRL PIN L100MM DIA2MM ABSRB W/ MTL TIP K WIRE GUID  - [id number]  KIT DRL PIN L100MM DIA2MM ABSRB W/ MTL TIP Vani Kinsey [facility] [id number] Left 1 Implanted         Drains: * No LDAs found *    Findings: Consistent with diagnosis    Materials: 4-0 Prolene    Injectables: 30cc 1:1 mix 0.5% marcaine plain with 1% Lidocaine with epinephrine     Hemostasis: Ankle pneumatic tourniquet.     Electronically signed by Jensen Wright DPM on 9/21/2022 at 11:45 AM

## 2022-09-21 NOTE — ANESTHESIA PRE PROCEDURE
Department of Anesthesiology  Preprocedure Note       Name:  Almaz Alexander   Age:  81 y.o.  :  1950                                          MRN:  [id number]         Date:  2022      Surgeon: Toro Mccormick):  Darryl Braulio Goldmann, DPM    Procedure: Procedure(s):  AKIN OSTEOTOMY LEFT HALLUX WITH STAPLE FIXATION, SAPSULOTOMY 2ND MPJ WITH POSS TENOTOMY AND REVISION OF WEIL OSTEOTOMY, ARTHRODESIS DIPJ 2ND DIGIT, ARTHRODESIS DIPJ 3RD TOE ALL ON THE LEFT    Medications prior to admission:   Prior to Admission medications    Medication Sig Start Date End Date Taking? Authorizing Provider   aspirin 81 MG EC tablet Take 81 mg by mouth daily   Yes Historical Provider, MD   b complex vitamins capsule Take 1 capsule by mouth daily   Yes Historical Provider, MD   lisinopril (PRINIVIL;ZESTRIL) 10 MG tablet Take 10 mg by mouth daily    Historical Provider, MD   acetaminophen (AMINOFEN) 325 MG tablet Take 2 tablets by mouth every 6 hours as needed for Pain 22   LIS Chappell - CNP   simvastatin (ZOCOR) 40 MG tablet Take 40 mg by mouth daily    Historical Provider, MD   Cholecalciferol (VITAMIN D3) 5000 units TABS Take by mouth    Historical Provider, MD   Omega-3 Fatty Acids (OMEGA-3 FISH OIL PO) Take 2 capsules by mouth     Historical Provider, MD   Multiple Vitamins-Minerals (MULTI COMPLETE PO) Take by mouth daily    Historical Provider, MD       Current medications:    No current facility-administered medications for this encounter. Allergies:  No Known Allergies    Problem List:  There is no problem list on file for this patient.       Past Medical History:        Diagnosis Date    Hyperlipidemia     Hypertension        Past Surgical History:        Procedure Laterality Date    FOOT SURGERY Left 2018    Distal chevrom osteotomy with screw fixation, akin osteotomy hallux, wiel osteotomy 2nd metatarsal with shortneing    HEMORRHOID SURGERY      NOSE SURGERY      AR OFFICE/OUTPT VISIT,PROCEDURE ONLY Left 3/28/2018    DISTAL CHEVRON OSTEOTOMY WITH SCREW FIXATION, AKIN OSTEOTOMY, WIEL OSTEOTOMY 2nd METATARSAL WITH SHORTENING, LEFT FOOT performed by Laina Ray DPM at / [place] [address]         Social History:    Social History     Tobacco Use    Smoking status: Former     Types: Cigarettes     Quit date: 1994     Years since quittin.2    Smokeless tobacco: Never   Substance Use Topics    Alcohol use: Yes     Alcohol/week: 3.0 standard drinks     Types: 3 Glasses of wine per week                                Counseling given: Not Answered      Vital Signs (Current):   Vitals:    22 1227 22 0840   BP:  133/72   Pulse:  56   Resp:  14   Temp:  96.8 °F (36 °C)   TempSrc:  Temporal   SpO2:  96%   Weight: 193 lb (87.5 kg) 195 lb (88.5 kg)   Height: 6' 3\" (1.905 m) 6' 3\" (1.905 m)                                              BP Readings from Last 3 Encounters:   22 133/72   22 (!) 156/73   21 (!) 187/87       NPO Status: Time of last liquid consumption: 730 (sip of water with medication)                        Time of last solid consumption:                         Date of last liquid consumption: 22                        Date of last solid food consumption: 22    BMI:   Wt Readings from Last 3 Encounters:   22 195 lb (88.5 kg)   22 195 lb (88.5 kg)   18 192 lb 6.4 oz (87.3 kg)     Body mass index is 24.37 kg/m².     CBC:   Lab Results   Component Value Date/Time    WBC 6.0 2022 12:42 PM    RBC 5.47 2022 12:42 PM    HGB 16.2 2022 12:42 PM    HCT 50.7 2022 12:42 PM    MCV 92.7 2022 12:42 PM     2022 12:42 PM       CMP:   Lab Results   Component Value Date/Time     2022 12:42 PM    K 5.1 2022 12:42 PM     2022 12:42 PM    CO2 26 2022 12:42 PM    BUN 25 2022 12:42 PM    CREATININE 1.4 2022 12:42 PM

## 2022-09-21 NOTE — OP NOTE
Operative Note      Patient: Jacob Costello  YOB: 1950  MRN: [id number]     Date of Procedure: 9/21/2022     Pre-Op Diagnosis: Hallux valgus (acquired), left foot [M20.12]  Contracture, left foot [M24.575]  Hammer toe of left foot [M20.42]     Post-Op Diagnosis: Same       Procedure(s):  AKIN OSTEOTOMY LEFT HALLUX WITH STAPLE FIXATION, CAPSULOTOMY 2ND MPJ WITH TENOTOMY 4TH AND 5TH DIGIT, EXOSTECTOMY 3ND METATARSAL, ARTHRODESIS DIPJ AND PIPJ 2ND DIGIT, ARTHRODESIS DIPJ AND PIPJ 3RD  TOE, ALL ON THE LEFT     Surgeon(s):  Boubacar Freitas DPM     Assistant:  Resident: Delvis Segura DPM; Cindy Pandya DPM     Anesthesia: General     Estimated Blood Loss (mL): Minimal     Complications: None     Specimens:   * No specimens in log *     Implants:  Implant Name Type Inv. Item Serial No.  Lot No. LRB No. Used Action   KIT DRL PIN L100MM DIA2MM ABSRB W/ MTL Guadalupe Christensen  - [id number]   KIT DRL PIN L100MM DIA2MM ABSRB W/ MTL TIP Cynthiaeboni Jha    ARTHREX [facility] [id number] Left 1 Implanted   STAPLE INT [id number] WIRE 1.5X1.5MM HND WRST NIT SPD CONT - [id number]   STAPLE INT [id number] WIRE 1.5X1.5MM HND WRST NIT SPD CONT   DEPUY SYNTHES [employer] [id number] Left 1 Implanted   KIT DRL PIN L100MM DIA2MM ABSRB W/ MTL TIP K WIRE GUID [facility] [id number]   KIT DRL PIN L100MM DIA2MM ABSRB W/ MTL TIP Coreye Zelalem [facility] [id number] Left 1 Implanted          Drains: * No LDAs found *     Findings: Consistent with diagnosis     Materials: 4-0 Prolene     Injectables: 30cc 1:1 mix 0.5% marcaine plain with 1% Lidocaine with epinephrine      Hemostasis: Ankle pneumatic tourniquet. Indications: Patient is a 81 y.o. male with a chief complaint of left foot pain who is being seen by Dr. Farzad Nevarez and being treated for Recurrent bunion of left foot. At this time all conservative options have been exhausted and surgical intervention is necessary.   The procedure has been explained to the patient and they understand the risks, benefits and possible complications including infection, recurrence. No promises have been made as to surgical outcome. Procedure: The patient was transported from the pre-op holding to the operating room and placed in a supine position. A pneumatic ankle tourniquet was applied to the left  ankle. The left foot was then prepped and draped in the normal aspetic manner. The left foot was then exsanguinated with an esmark and the tourniquet was inflated to 250 mmHg. Attention was then directed to the dorsal aspect of the hallux proximal phalanx. A 4cm linear incision was made overlying the proximal phalanx of the hallux. Carefull dissection down to the level of the periosteum was utilized. A freer periosteal elevator was utilized to remove all ligamentous and subcutaneous attachments. A freer elevator and pliers were then utilized to remove the previous staple. The sagittal saw was then used to create a dorsal to plantar cut, making sure to maintain the lateral phalangeal cortex. A distal osteotomy cut was then made, creating a 2mm wedge cut. The osteotomy site was then fixated with a Synthes staple. A linear incision incision was made along the dorsal aspect of the second metatarsophalangeal joint, extending distally to the distal interphalangeal joint. The incision was approximately 6 cm in length. It was made full thickness using a scalpel blade. The skin incisions were then carefully retracted using Vale retractors, and blunt dissection was then carried down to the level of the extensor mike apparatus. Care was taken to avoid all vital neurovascular structures, as well as Bovie all small bleeding vessels. A scalpel blade was then used to make a transverse tenotomy cut on the most dorsal aspect of the proximal interphalangeal joint. The medial and lateral collateral ligaments were then released and the extensor tendon was dissected proximally.  Following the transverse tenotomy, exposure to the dorsal extensor mike apparatus was performed over the metatarsophalangeal joint itself. The dorsal capsule, as well as medial and lateral-collateral ligaments were transected allowing adequate exposure of the metatarsal head itself. A TPS sagittal saw was then used to resect a bony prominence from the dorsal aspect of the 2nd metatarsal head. A TPS sagittal saw was then used to remove the proximal phalanx head, just proximal to the articular cartilage, approximately 1 mm in length, removing all articular cartilage. This resection was then repeated for the proximal and distal aspects of the middle phalanx, and the proximal articular surface of the distal phalanx. A rongeur was then used to remove the medial, dorsal and lateral bony prominences following the osteotomy cut. A 0.062 K-wire was then retrograded out the distal aspect of the toe. This K-wire was then driven proximally, adequately opposing the proximal base of the middle phalanx to the distal osteotomy site of the proximal phalanx, as well as the head and base of the DIPJ. The Trim-IT spin pin was then removed from its packaging, and inserted across both joints in a retrograde fashion. The protruding end of the pin was then cut. Good position of the toe was verified. A linear incision incision was made along the dorsal aspect of the 3rd digit, extending distally to the distal interphalangeal joint. The incision was approximately 4 cm in length. It was made full thickness using a scalpel blade. The skin incisions were then carefully retracted using Vale retractors, and blunt dissection was then carried down to the level of the extensor mike apparatus. Care was taken to avoid all vital neurovascular structures, as well as Bovie all small bleeding vessels. A scalpel blade was then used to make a transverse tenotomy cut on the most dorsal aspect of the proximal interphalangeal joint.  The medial and lateral collateral ligaments were then released and the extensor tendon was dissected proximally. Following the transverse tenotomy, exposure to the dorsal extensor mike apparatus was performed over the metatarsophalangeal joint itself. The dorsal capsule, as well as medial and lateral-collateral ligaments were transected allowing adequate exposure of the metatarsal head itself. A TPS sagittal saw was then used to resect a bony prominence from the dorsal aspect of the 2nd metatarsal head. A TPS sagittal saw was then used to remove the proximal phalanx head, just proximal to the articular cartilage, approximately 1 mm in length, removing all articular cartilage. This resection was then repeated for the proximal and distal aspects of the middle phalanx, and the proximal articular surface of the distal phalanx. A rongeur was then used to remove the medial, dorsal and lateral bony prominences following the osteotomy cut. A 0.062 K-wire was then retrograded out the distal aspect of the toe. This K-wire was then driven proximally, adequately opposing the proximal base of the middle phalanx to the distal osteotomy site of the proximal phalanx, as well as the head and base of the DIPJ. The Trim-IT spin pin was then removed from its packaging, and inserted across both joints in a retrograde fashion. The protruding end of the pin was then cut. Good position of the toe was verified. Finally, utilizing a Sycuan handle with #62 blade, the long and short flexor tendons of digits 4 and  5 were transected in a percutaneous technique. Good position of the digits was then verified. The incision was flushed with copious amounts of sterile saline. The skin was closed using 4-0 Prolene. A post-op injection of 30cc 1:1 mix 0.5% marcaine plain with 1% Lidocaine with epinephrine  was injected. The incision was dressed with adaptic, 4x4's, kerlix, ACE.  The pneumatic ankle tourniquet was then deflated and an immediate hyperemic response was noted to all digits of the left foot. A post-op shoe was then applied. The patient was transported to the PACU with VSS and NVS intact to all digits of the left foot. No complications were noted throughout the procedure. The patient is to be discharged per PACU protocol. The patient is to follow-up with Dr. Bethany Stiles in the office.       Rosemary Payne DPM, PGY-2  Foot and Ankle Surgical Resident  9/21/2022  11:50 AM      Electronically signed by Rosemary Payne DPM on 9/21/2022 at 11:49 AM

## 2022-09-21 NOTE — H&P
[facility]  History and Physical Update    Pt Name: Gali Bernardo  MRN: [id number]  YOB: 1950  Date of evaluation: 9/21/2022    [x] I have examined the patient and reviewed the H&P/Consult and there are no changes to the patient or plans.     [] I have examined the patient and reviewed the H&P/Consult and have noted the following changes:        My Wheeler DPM DPM  Electronically signed 9/21/2022 at 7:29 AM

## 2022-09-21 NOTE — ANESTHESIA POSTPROCEDURE EVALUATION
Department of Anesthesiology  Postprocedure Note    Patient: Hina Segura  MRN: [id number]  YOB: 1950  Date of evaluation: 9/21/2022      Procedure Summary     Date: 09/21/22 Room / Location: [address] [place] [address] 01 / 138 [facility] Dante Ricketts    Anesthesia Start: 9087 Anesthesia Stop: 1204    Procedure: AKIN OSTEOTOMY LEFT HALLUX WITH STAPLE FIXATION, CAPSULOTOMY 2ND MPJ WITH TENOTOMY 4TH AND 5TH DIGIT, EXOSTECTOMY 3ND METATARSAL, ARTHRODESIS DIPJ AND PIPJ 2ND DIGIT, ARTHRODESIS DIPJ AND PIPJ 3RD  TOE, ALL ON THE LEFT (Left) Diagnosis:       Hallux valgus (acquired), left foot      Contracture, left foot      Hammer toe of left foot      (Hallux valgus (acquired), left foot [M20.12])      (Contracture, left foot [M24.575])      (Hammer toe of left foot [M20.42])    Surgeons: Sigifredo Beck DPM Responsible Provider: Thuy Burroughs MD    Anesthesia Type: general ASA Status: 2          Anesthesia Type: No value filed. Brett Phase I: Brett Score: 9    Brett Phase II:        Anesthesia Post Evaluation    Patient location during evaluation: PACU  Patient participation: complete - patient participated  Level of consciousness: awake and alert  Airway patency: patent  Nausea & Vomiting: no nausea and no vomiting  Complications: no  Cardiovascular status: hemodynamically stable  Respiratory status: acceptable  Hydration status: euvolemic      [address]  POST-ANESTHESIA NOTE       Name:  Hina Segura                                         Age:  81 y.o.   MRN:  [id number]      Last Vitals:  BP (!) 122/56   Pulse 60   Temp (!) 96.5 °F (35.8 °C) (Temporal)   Resp 16   Ht 6' 3\" (1.905 m)   Wt 195 lb (88.5 kg)   SpO2 95%   BMI 24.37 kg/m²   Patient Vitals for the past 4 hrs:   BP Temp Temp src Pulse Resp SpO2 Height Weight   09/21/22 1231 (!) 122/56 -- -- 60 16 95 % -- --   09/21/22 1226 (!) 120/57 -- -- 54 16 95 % -- --   09/21/22 1224 -- -- -- -- (P) 16 -- -- --   09/21/22 2095 (!) 118/59 -- -- 59 16 95 % -- --   09/21/22 1216 (!) 110/59 -- -- 59 12 95 % -- --   09/21/22 1211 (!) 111/56 -- -- 63 16 97 % -- --   09/21/22 1205 (!) 107/55 -- -- 59 13 95 % -- --   09/21/22 1200 138/62 -- -- 59 16 97 % -- --   09/21/22 1155 137/60 -- -- 66 16 97 % -- --   09/21/22 1154 134/65 -- -- 73 16 98 % -- --   09/21/22 1153 137/60 (!) 96.5 °F (35.8 °C) Temporal 75 16 98 % -- --   09/21/22 0840 133/72 96.8 °F (36 °C) Temporal 56 14 96 % 6' 3\" (1.905 m) 195 lb (88.5 kg)       Level of Consciousness:  Awake    Respiratory:  Stable    Oxygen Saturation:  Stable    Cardiovascular:  Stable    Hydration:  Adequate    PONV:  Stable    Post-op Pain:  Adequate analgesia    Post-op Assessment:  No apparent anesthetic complications    Additional Follow-Up / Treatment / Comment:  None    Jessee Osorio MD  September 21, 2022   12:35 PM

## 2023-03-04 ENCOUNTER — HOSPITAL ENCOUNTER (EMERGENCY)
Age: 73
Discharge: HOME | End: 2023-03-04
Payer: MEDICARE

## 2023-03-04 VITALS
RESPIRATION RATE: 14 BRPM | SYSTOLIC BLOOD PRESSURE: 154 MMHG | OXYGEN SATURATION: 99 % | TEMPERATURE: 98.2 F | HEART RATE: 80 BPM | DIASTOLIC BLOOD PRESSURE: 79 MMHG

## 2023-03-04 DIAGNOSIS — K04.7 DENTAL ABSCESS: Primary | ICD-10-CM

## 2023-03-04 PROCEDURE — 99213 OFFICE O/P EST LOW 20 MIN: CPT

## 2023-03-04 PROCEDURE — 99213 OFFICE O/P EST LOW 20 MIN: CPT | Performed by: NURSE PRACTITIONER

## 2023-03-04 RX ORDER — KETOROLAC TROMETHAMINE 10 MG/1
10 TABLET, FILM COATED ORAL EVERY 6 HOURS PRN
Qty: 20 TABLET | Refills: 0 | Status: SHIPPED | OUTPATIENT
Start: 2023-03-04

## 2023-03-04 RX ORDER — AMOXICILLIN AND CLAVULANATE POTASSIUM 875; 125 MG/1; MG/1
1 TABLET, FILM COATED ORAL 2 TIMES DAILY
Qty: 14 TABLET | Refills: 0 | Status: SHIPPED | OUTPATIENT
Start: 2023-03-04 | End: 2023-03-11

## 2023-03-04 ASSESSMENT — ENCOUNTER SYMPTOMS
SHORTNESS OF BREATH: 0
SORE THROAT: 0
CHEST TIGHTNESS: 0
NAUSEA: 0
VOMITING: 0
DIARRHEA: 0
RHINORRHEA: 0
COUGH: 0

## 2023-03-04 ASSESSMENT — PAIN DESCRIPTION - FREQUENCY: FREQUENCY: CONTINUOUS

## 2023-03-04 ASSESSMENT — PAIN SCALES - GENERAL: PAINLEVEL_OUTOF10: 8

## 2023-03-04 ASSESSMENT — PAIN DESCRIPTION - PAIN TYPE: TYPE: ACUTE PAIN

## 2023-03-04 ASSESSMENT — PAIN DESCRIPTION - LOCATION: LOCATION: TEETH

## 2023-03-04 ASSESSMENT — PAIN - FUNCTIONAL ASSESSMENT: PAIN_FUNCTIONAL_ASSESSMENT: 0-10

## 2023-03-04 ASSESSMENT — PAIN DESCRIPTION - ORIENTATION: ORIENTATION: LEFT;LOWER

## 2023-03-04 NOTE — ED PROVIDER NOTES
[address]  Urgent Care Encounter       CHIEF COMPLAINT       Chief Complaint   Patient presents with    Dental Pain     Left lower dental abscess and pain       Nurses Notes reviewed and I agree except as noted in the HPI. HISTORY OF PRESENT ILLNESS   Saravanan Mendoza is a 72 y.o. male who presents to the [facility] ambulatory care center for evaluation of dental pain. He reports that the symptoms started last night and progressively worsened. He reports that he has used ibuprofen and warm salt water gargles. He is noted to have a dental abscess on the left lower gums. He does report a fever of 99.4. He denies emesis or diarrhea. The history is provided by the patient. No  was used. REVIEW OF SYSTEMS     Review of Systems   Constitutional:  Negative for activity change, appetite change, chills, fatigue and fever. HENT:  Positive for dental problem. Negative for ear discharge, ear pain, rhinorrhea and sore throat. Respiratory:  Negative for cough, chest tightness and shortness of breath. Cardiovascular:  Negative for chest pain. Gastrointestinal:  Negative for diarrhea, nausea and vomiting. Genitourinary:  Negative for dysuria. Skin:  Negative for rash. Allergic/Immunologic: Negative for environmental allergies and food allergies. Neurological:  Negative for dizziness and headaches. PAST MEDICAL HISTORY         Diagnosis Date    Hyperlipidemia     Hypertension        SURGICALHISTORY     Patient  has a past surgical history that includes Hemorrhoid surgery; Tonsillectomy; Nose surgery; Thumb amputation; Foot surgery (Left, 03/28/2018); pr office/outpt visit,procedure only (Left, 3/28/2018); and osteotomy (Left, 9/21/2022).     CURRENT MEDICATIONS       Previous Medications    ACETAMINOPHEN (AMINOFEN) 325 MG TABLET    Take 2 tablets by mouth every 6 hours as needed for Pain    ASPIRIN 81 MG EC TABLET    Take 81 mg by mouth daily    B COMPLEX VITAMINS CAPSULE    Take 1 capsule by mouth daily    CHOLECALCIFEROL (VITAMIN D3) 5000 UNITS TABS    Take by mouth    LISINOPRIL (PRINIVIL;ZESTRIL) 10 MG TABLET    Take 10 mg by mouth daily    MULTIPLE VITAMINS-MINERALS (MULTI COMPLETE PO)    Take by mouth daily    OMEGA-3 FATTY ACIDS (OMEGA-3 FISH OIL PO)    Take 2 capsules by mouth     SIMVASTATIN (ZOCOR) 40 MG TABLET    Take 40 mg by mouth daily       ALLERGIES     Patient is has No Known Allergies. Patients   Immunization History   Administered Date(s) Administered    COVID-19, PFIZER PURPLE top, DILUTE for use, (age 15 y+), 30mcg/0.3mL 02/11/2021, 03/04/2021, 11/09/2021       FAMILY HISTORY     Patient's family history includes Early Death (age of onset: 39) in his brother; Early Death (age of onset: 62) in his father; Heart Attack in his brother and father; Stroke in his mother. SOCIAL HISTORY     Patient  reports that he quit smoking about 28 years ago. He has never used smokeless tobacco. He reports current alcohol use of about 3.0 standard drinks per week. He reports that he does not use drugs. PHYSICAL EXAM     ED TRIAGE VITALS  BP: (!) 154/79, Temp: 98.2 °F (36.8 °C), Heart Rate: 80, Resp: 14, SpO2: 99 %,Estimated body mass index is 24.37 kg/m² as calculated from the following:    Height as of 9/21/22: 6' 3\" (1.905 m). Weight as of 9/21/22: 195 lb (88.5 kg). ,No LMP for male patient. Physical Exam  Vitals and nursing note reviewed. Constitutional:       General: He is not in acute distress. Appearance: Normal appearance. He is not ill-appearing, toxic-appearing or diaphoretic. HENT:      Head: Normocephalic. Right Ear: Ear canal and external ear normal.      Left Ear: Ear canal and external ear normal.      Nose: Nose normal. No congestion or rhinorrhea. Mouth/Throat:      Mouth: Mucous membranes are moist.      Dentition: Dental tenderness, gingival swelling, dental caries and dental abscesses present.       Pharynx: Oropharynx is clear. No oropharyngeal exudate or posterior oropharyngeal erythema. Cardiovascular:      Rate and Rhythm: Normal rate. Pulses: Normal pulses. Pulmonary:      Effort: Pulmonary effort is normal. No respiratory distress. Breath sounds: No stridor. No wheezing or rhonchi. Abdominal:      General: Abdomen is flat. Bowel sounds are normal.      Palpations: Abdomen is soft. Musculoskeletal:         General: No swelling or tenderness. Normal range of motion. Cervical back: Normal range of motion. Neurological:      General: No focal deficit present. Mental Status: He is alert and oriented to person, place, and time. Psychiatric:         Mood and Affect: Mood normal.         Behavior: Behavior normal.       DIAGNOSTIC RESULTS     Labs:No results found for this visit on 03/04/23. IMAGING:    No orders to display         EKG: None      URGENT CARE COURSE:     Vitals:    03/04/23 0816   BP: (!) 154/79   Pulse: 80   Resp: 14   Temp: 98.2 °F (36.8 °C)   TempSrc: Oral   SpO2: 99%       Medications - No data to display         PROCEDURES:  None    FINAL IMPRESSION      1. Dental abscess          DISPOSITION/ PLAN     Patient seen and evaluated for dental pain. Assessment consistent with a dental abscess in the left lower gumline. He is provided a prescription for Augmentin and Toradol. He is instructed to follow-up with his PCP or dentist in 2 to 3 days with new or worsening symptoms. He is instructed use over-the-counter Tylenol as needed for breakthrough pain. He is instructed not to use other NSAIDs while taking Toradol. He is agreeable with the above plan and denies questions or concerns at this time.       PATIENT REFERRED TO:  Joauqin Guerrero MD  [facility] [facility] [address]      DISCHARGE MEDICATIONS:  New Prescriptions    AMOXICILLIN-CLAVULANATE (AUGMENTIN) 875-125 MG PER TABLET    Take 1 tablet by mouth 2 times daily for 7 days    KETOROLAC (TORADOL) 10 MG TABLET    Take 1 tablet by mouth every 6 hours as needed for Pain       Discontinued Medications    No medications on file       Current Discharge Medication List          LIS Mendez CNP    (Please note that portions of this note were completed with a voice recognition program. Efforts were made to edit the dictations but occasionally words are mis-transcribed.)           LIS Mendez CNP  03/04/23 7672

## 2023-03-04 NOTE — ED NOTES
Patient discharge instructions given to pt and pt verbalized understanding, 2 px given, no other needs at this time, and pt left in stable condition.      Bret Krueger RN  03/04/23 5215

## 2023-03-04 NOTE — ED TRIAGE NOTES
Patient walked to room 5 for dental pain and swelling onset yesterday. States he has a pus pocket in his left lower molar tooth.

## 2023-09-27 ENCOUNTER — HOSPITAL ENCOUNTER (OUTPATIENT)
Dept: MRI IMAGING | Age: 73
Discharge: HOME | End: 2023-09-27
Attending: FAMILY MEDICINE
Payer: MEDICARE

## 2023-09-27 DIAGNOSIS — M54.12 RADICULOPATHY OF CERVICAL SPINE: ICD-10-CM

## 2023-09-27 DIAGNOSIS — M79.603 PAIN OF UPPER EXTREMITY, UNSPECIFIED LATERALITY: ICD-10-CM

## 2023-09-27 PROCEDURE — 72141 MRI NECK SPINE W/O DYE: CPT

## 2024-03-04 ENCOUNTER — HOSPITAL ENCOUNTER (EMERGENCY)
Age: 74
Discharge: HOME | End: 2024-03-04
Payer: MEDICARE

## 2024-03-04 VITALS
DIASTOLIC BLOOD PRESSURE: 74 MMHG | RESPIRATION RATE: 18 BRPM | SYSTOLIC BLOOD PRESSURE: 120 MMHG | TEMPERATURE: 99.2 F | OXYGEN SATURATION: 95 % | HEART RATE: 92 BPM

## 2024-03-04 DIAGNOSIS — J10.1 INFLUENZA A: Primary | ICD-10-CM

## 2024-03-04 LAB
FLUAV AG SPEC QL: POSITIVE
FLUBV AG SPEC QL: NEGATIVE

## 2024-03-04 PROCEDURE — 99213 OFFICE O/P EST LOW 20 MIN: CPT | Performed by: NURSE PRACTITIONER

## 2024-03-04 PROCEDURE — 87804 INFLUENZA ASSAY W/OPTIC: CPT

## 2024-03-04 PROCEDURE — 99213 OFFICE O/P EST LOW 20 MIN: CPT

## 2024-03-04 RX ORDER — BENZONATATE 200 MG/1
200 CAPSULE ORAL 3 TIMES DAILY PRN
Qty: 30 CAPSULE | Refills: 0 | Status: SHIPPED | OUTPATIENT
Start: 2024-03-04 | End: 2024-03-11

## 2024-03-04 ASSESSMENT — ENCOUNTER SYMPTOMS
NAUSEA: 0
VOMITING: 0
COUGH: 1
SHORTNESS OF BREATH: 0
RHINORRHEA: 1
SORE THROAT: 0

## 2024-03-04 ASSESSMENT — PAIN SCALES - GENERAL: PAINLEVEL_OUTOF10: 3

## 2024-03-04 ASSESSMENT — PAIN DESCRIPTION - PAIN TYPE: TYPE: ACUTE PAIN

## 2024-03-04 ASSESSMENT — PAIN DESCRIPTION - LOCATION: LOCATION: THROAT;OTHER (COMMENT)

## 2024-03-04 ASSESSMENT — PAIN DESCRIPTION - DESCRIPTORS: DESCRIPTORS: ACHING;SPASM

## 2024-03-04 NOTE — DISCHARGE INSTR - COC
Continuity of Care Form    Patient Name: Saravanan Bustamante   :  1950  MRN:  [id number]    Admit date:  3/4/2024  Discharge date:  ***    Code Status Order: Prior   Advance Directives:     Admitting Physician:  No admitting provider for patient encounter.  PCP: Florian Leon MD    Discharging Nurse: ***  Discharging Hospital Unit/Room#:   Discharging Unit Phone Number: ***    Emergency Contact:   Extended Emergency Contact Information  Primary Emergency Contact: Madison Bustamante  Address: [address] [place] [id number] [facility]  Home Phone: [phone]  Mobile Phone: [phone]  Relation: Spouse    Past Surgical History:  Past Surgical History:   Procedure Laterality Date    FOOT SURGERY Left 2018    Distal chevrom osteotomy with screw fixation, akin osteotomy hallux, wiel osteotomy 2nd metatarsal with shortneing    HEMORRHOID SURGERY      NOSE SURGERY      OSTEOTOMY Left 2022    AKIN OSTEOTOMY LEFT HALLUX WITH STAPLE FIXATION, CAPSULOTOMY 2ND MPJ WITH TENOTOMY 4TH AND 5TH DIGIT, EXOSTECTOMY 3ND METATARSAL, ARTHRODESIS DIPJ AND PIPJ 2ND DIGIT, ARTHRODESIS DIPJ AND PIPJ 3RD  TOE, ALL ON THE LEFT performed by Boubacar Lopez DPM at [facility] OR    [place] OFFICE/OUTPT VISIT,PROCEDURE ONLY Left 3/28/2018    DISTAL CHEVRON OSTEOTOMY WITH SCREW FIXATION, AKIN OSTEOTOMY, WIEL OSTEOTOMY 2nd METATARSAL WITH SHORTENING, LEFT FOOT performed by Boubacar Lopez DPM at [facility] OR    THUMB AMPUTATION      TONSILLECTOMY         Immunization History:   Immunization History   Administered Date(s) Administered    COVID-19, MODERNA Bivalent, (age 12y+), IM, 50 mcg/0.5 mL 2022, 2023    COVID-19, PFIZER PURPLE top, DILUTE for use, (age 12 y+), 30mcg/0.3mL 2021, 2021, 2021       Active Problems:  There is no problem list on file for this patient.      Isolation/Infection:   Isolation            No Isolation          Patient

## 2024-03-04 NOTE — ED PROVIDER NOTES
[facility]  Urgent Care Encounter       CHIEF COMPLAINT       Chief Complaint   Patient presents with    Cough    Fever     A high 101 onset Thursday        Nurses Notes reviewed and I agree except as noted in the HPI.  HISTORY OF PRESENT ILLNESS   Saravanan Bustamante is a 74 y.o. male who presents to urgent care for evaluation of cough, fever, headache, and nasal congestion for 4 days.  Patient states that symptoms have progressed and he has had intermittent fevers.  He went and saw his PCP who gave him prescription for cough medication and advised him to take over-the-counter Claritin for an upper respiratory infection.  Patient presents today with continued symptoms, stating his wife had surgery about a week and a half ago and he wants to rule out influenza.  He denies any chest tightness or shortness of breath at this time.    The history is provided by the patient.       REVIEW OF SYSTEMS     Review of Systems   Constitutional:  Positive for chills and fever.   HENT:  Positive for rhinorrhea. Negative for congestion and sore throat.    Respiratory:  Positive for cough. Negative for shortness of breath.    Cardiovascular:  Negative for chest pain.   Gastrointestinal:  Negative for nausea and vomiting.   Genitourinary:  Negative for difficulty urinating.   Musculoskeletal:  Negative for arthralgias and myalgias.   Skin:  Negative for rash.   Allergic/Immunologic: Negative for environmental allergies.   Neurological:  Positive for headaches.       PAST MEDICAL HISTORY         Diagnosis Date    Hyperlipidemia     Hypertension        SURGICALHISTORY     Patient  has a past surgical history that includes Hemorrhoid surgery; Tonsillectomy; Nose surgery; Thumb amputation; Foot surgery (Left, 03/28/2018); pr office/outpt visit,procedure only (Left, 3/28/2018); and osteotomy (Left, 9/21/2022).    CURRENT MEDICATIONS       Previous Medications    ACETAMINOPHEN (AMINOFEN) 325 MG TABLET    Take 2 tablets by  mouth every 6 hours as needed for Pain    ASPIRIN 81 MG EC TABLET    Take 1 tablet by mouth daily    B COMPLEX VITAMINS CAPSULE    Take 1 capsule by mouth daily    CHOLECALCIFEROL (VITAMIN D3) 5000 UNITS TABS    Take by mouth    KETOROLAC (TORADOL) 10 MG TABLET    Take 1 tablet by mouth every 6 hours as needed for Pain    LISINOPRIL (PRINIVIL;ZESTRIL) 10 MG TABLET    Take 1 tablet by mouth daily    MULTIPLE VITAMINS-MINERALS (MULTI COMPLETE PO)    Take by mouth daily    OMEGA-3 FATTY ACIDS (OMEGA-3 FISH OIL PO)    Take 2 capsules by mouth     SIMVASTATIN (ZOCOR) 40 MG TABLET    Take 1 tablet by mouth daily       ALLERGIES     Patient is has No Known Allergies.    Patients   Immunization History   Administered Date(s) Administered    COVID-19, MODERNA Bivalent, (age 12y+), IM, 50 mcg/0.5 mL 09/07/2022, 06/16/2023    COVID-19, PFIZER PURPLE top, DILUTE for use, (age 12 y+), 30mcg/0.3mL 02/11/2021, 03/04/2021, 11/09/2021       FAMILY HISTORY     Patient's family history includes Early Death (age of onset: 45) in his brother; Early Death (age of onset: 58) in his father; Heart Attack in his brother and father; Stroke in his mother.    SOCIAL HISTORY     Patient  reports that he quit smoking about 29 years ago. He has never used smokeless tobacco. He reports current alcohol use of about 3.0 standard drinks of alcohol per week. He reports that he does not use drugs.    PHYSICAL EXAM     ED TRIAGE VITALS  BP: 120/74, Temp: 99.2 °F (37.3 °C), Pulse: 92, Respirations: 18, SpO2: 95 %,Estimated body mass index is 24.37 kg/m² as calculated from the following:    Height as of 9/21/22: 1.905 m (6' 3\").    Weight as of 9/21/22: 88.5 kg (195 lb).,No LMP for male patient.    Physical Exam  Vitals and nursing note reviewed.   Constitutional:       General: He is not in acute distress.     Appearance: He is well-developed. He is not diaphoretic.   HENT:      Right Ear: Tympanic membrane and ear canal normal.      Left Ear: Tympanic

## 2024-03-17 ENCOUNTER — APPOINTMENT (OUTPATIENT)
Dept: GENERAL RADIOLOGY | Age: 74
End: 2024-03-17
Payer: MEDICARE

## 2024-03-17 ENCOUNTER — HOSPITAL ENCOUNTER (EMERGENCY)
Age: 74
Discharge: HOME | End: 2024-03-17
Attending: EMERGENCY MEDICINE
Payer: MEDICARE

## 2024-03-17 VITALS
WEIGHT: 205 LBS | BODY MASS INDEX: 26.31 KG/M2 | TEMPERATURE: 98 F | DIASTOLIC BLOOD PRESSURE: 73 MMHG | RESPIRATION RATE: 20 BRPM | HEIGHT: 74 IN | OXYGEN SATURATION: 91 % | SYSTOLIC BLOOD PRESSURE: 123 MMHG | HEART RATE: 88 BPM

## 2024-03-17 DIAGNOSIS — J18.9 PNEUMONIA OF RIGHT LOWER LOBE DUE TO INFECTIOUS ORGANISM: Primary | ICD-10-CM

## 2024-03-17 LAB
ALBUMIN SERPL BCG-MCNC: 3.3 G/DL (ref 3.5–5.1)
ALP SERPL-CCNC: 148 U/L (ref 38–126)
ALT SERPL W/O P-5'-P-CCNC: 37 U/L (ref 11–66)
ANION GAP SERPL CALC-SCNC: 15 MEQ/L (ref 8–16)
AST SERPL-CCNC: 31 U/L (ref 5–40)
BILIRUB CONJ SERPL-MCNC: 0.3 MG/DL (ref 0–0.3)
BILIRUB SERPL-MCNC: 0.8 MG/DL (ref 0.3–1.2)
BUN SERPL-MCNC: 35 MG/DL (ref 7–22)
CALCIUM SERPL-MCNC: 9.6 MG/DL (ref 8.5–10.5)
CHLORIDE SERPL-SCNC: 101 MEQ/L (ref 98–111)
CO2 SERPL-SCNC: 23 MEQ/L (ref 23–33)
CREAT SERPL-MCNC: 1.9 MG/DL (ref 0.4–1.2)
FLUAV RNA RESP QL NAA+PROBE: NOT DETECTED
FLUBV RNA RESP QL NAA+PROBE: NOT DETECTED
GFR SERPL CREATININE-BSD FRML MDRD: 37 ML/MIN/1.73M2
GLUCOSE SERPL-MCNC: 116 MG/DL (ref 70–108)
LIPASE SERPL-CCNC: 26.1 U/L (ref 5.6–51.3)
NT-PROBNP SERPL IA-MCNC: 265.2 PG/ML (ref 0–124)
OSMOLALITY SERPL CALC.SUM OF ELEC: 286.5 MOSMOL/KG (ref 275–300)
POTASSIUM SERPL-SCNC: 4.1 MEQ/L (ref 3.5–5.2)
PROT SERPL-MCNC: 7 G/DL (ref 6.1–8)
SARS-COV-2 RNA RESP QL NAA+PROBE: NOT DETECTED
SCAN OF BLOOD SMEAR: NORMAL
SODIUM SERPL-SCNC: 139 MEQ/L (ref 135–145)

## 2024-03-17 PROCEDURE — 85025 COMPLETE CBC W/AUTO DIFF WBC: CPT

## 2024-03-17 PROCEDURE — 82248 BILIRUBIN DIRECT: CPT

## 2024-03-17 PROCEDURE — 6370000000 HC RX 637 (ALT 250 FOR IP): Performed by: EMERGENCY MEDICINE

## 2024-03-17 PROCEDURE — 87636 SARSCOV2 & INF A&B AMP PRB: CPT

## 2024-03-17 PROCEDURE — 71046 X-RAY EXAM CHEST 2 VIEWS: CPT

## 2024-03-17 PROCEDURE — 83690 ASSAY OF LIPASE: CPT

## 2024-03-17 PROCEDURE — 94761 N-INVAS EAR/PLS OXIMETRY MLT: CPT

## 2024-03-17 PROCEDURE — 80053 COMPREHEN METABOLIC PANEL: CPT

## 2024-03-17 PROCEDURE — 36415 COLL VENOUS BLD VENIPUNCTURE: CPT

## 2024-03-17 PROCEDURE — 6360000002 HC RX W HCPCS: Performed by: EMERGENCY MEDICINE

## 2024-03-17 PROCEDURE — 96365 THER/PROPH/DIAG IV INF INIT: CPT

## 2024-03-17 PROCEDURE — 99285 EMERGENCY DEPT VISIT HI MDM: CPT

## 2024-03-17 PROCEDURE — 93005 ELECTROCARDIOGRAM TRACING: CPT | Performed by: EMERGENCY MEDICINE

## 2024-03-17 PROCEDURE — 87040 BLOOD CULTURE FOR BACTERIA: CPT

## 2024-03-17 PROCEDURE — 83880 ASSAY OF NATRIURETIC PEPTIDE: CPT

## 2024-03-17 PROCEDURE — 94640 AIRWAY INHALATION TREATMENT: CPT

## 2024-03-17 RX ORDER — GUAIFENESIN/DEXTROMETHORPHAN 100-10MG/5
5 SYRUP ORAL 3 TIMES DAILY PRN
Qty: 120 ML | Refills: 0 | Status: SHIPPED | OUTPATIENT
Start: 2024-03-17 | End: 2024-03-27

## 2024-03-17 RX ORDER — ALBUTEROL SULFATE 90 UG/1
2 AEROSOL, METERED RESPIRATORY (INHALATION) EVERY 6 HOURS PRN
Status: DISCONTINUED | OUTPATIENT
Start: 2024-03-17 | End: 2024-03-17 | Stop reason: HOSPADM

## 2024-03-17 RX ORDER — IPRATROPIUM BROMIDE AND ALBUTEROL SULFATE 2.5; .5 MG/3ML; MG/3ML
1 SOLUTION RESPIRATORY (INHALATION) ONCE
Status: COMPLETED | OUTPATIENT
Start: 2024-03-17 | End: 2024-03-17

## 2024-03-17 RX ORDER — LEVOFLOXACIN 500 MG/1
500 TABLET, FILM COATED ORAL DAILY
Qty: 10 TABLET | Refills: 0 | Status: SHIPPED | OUTPATIENT
Start: 2024-03-17 | End: 2024-03-27

## 2024-03-17 RX ORDER — GUAIFENESIN/DEXTROMETHORPHAN 100-10MG/5
5 SYRUP ORAL ONCE
Status: COMPLETED | OUTPATIENT
Start: 2024-03-17 | End: 2024-03-17

## 2024-03-17 RX ORDER — LEVOFLOXACIN 5 MG/ML
500 INJECTION, SOLUTION INTRAVENOUS ONCE
Status: COMPLETED | OUTPATIENT
Start: 2024-03-17 | End: 2024-03-17

## 2024-03-17 RX ADMIN — LEVOFLOXACIN 500 MG: 5 INJECTION, SOLUTION INTRAVENOUS at 10:00

## 2024-03-17 RX ADMIN — IPRATROPIUM BROMIDE AND ALBUTEROL SULFATE 1 DOSE: .5; 3 SOLUTION RESPIRATORY (INHALATION) at 09:04

## 2024-03-17 RX ADMIN — GUAIFENESIN AND DEXTROMETHORPHAN 5 ML: 100; 10 SYRUP ORAL at 09:40

## 2024-03-17 NOTE — ED NOTES
Patient to ED for a consistent cough and fatigue. Patient diagnosed with influenza two weeks ago. Patient has been seen by his PCP and prescribed medications to help. Patient reports that symptoms are worsened and feels drained. Patient concerned that he may have developed pneumonia.  Patient alert and oriented. Patient able to ambulate to room 7 for triage without difficulty.

## 2024-03-17 NOTE — ED NOTES
Reassessment of the patients Cough   is unchanged, the patients pain reassessment is a 5/10, Side rails up times 2, call light in reach, will continue to monitor.

## 2024-03-17 NOTE — DISCHARGE INSTRUCTIONS
Patient has what appears to be a pneumonia in the right lower lobe.  Patient has been given Levaquin and a cough medicine he is instructed to take those as prescribed.  Patient is also instructed to use the albuterol inhaler as prescribed.  He is instructed use Tylenol and Motrin for any pain or fevers.  He is instructed to follow-up with a primary care physician to do so within the next 1 to 2 days.  He is instructed to return to the nearest emergency room immediately for any new or worsening complaints

## 2024-03-17 NOTE — ED PROVIDER NOTES
a cardiologist.  Normal sinus rhythm left axis deviation right bundle branch block ventricular rate of 83 MD interval 152 QRS duration 108 QT interval 376 QTc of 441.    RADIOLOGY: non-plain film images(s) such as CT, Ultrasound and MRI are read by the radiologist.  XR CHEST (2 VW)   Final Result   Right lower lobe pneumonia. Follow-up radiograph recommended to ensure resolution.               **This report has been created using voice recognition software. It may contain minor errors which are inherent in voice recognition technology.**      Final report electronically signed by Dr. Cecile Banda on 3/17/2024 8:54 AM            LABS:   Labs Reviewed   CBC WITH AUTO DIFFERENTIAL - Abnormal; Notable for the following components:       Result Value    WBC 12.4 (*)     RDW-CV 14.6 (*)     RDW-SD 46.2 (*)     All other components within normal limits   BASIC METABOLIC PANEL - Abnormal; Notable for the following components:    Glucose 116 (*)     BUN 35 (*)     Creatinine 1.9 (*)     All other components within normal limits   HEPATIC FUNCTION PANEL - Abnormal; Notable for the following components:    Albumin 3.3 (*)     Alkaline Phosphatase 148 (*)     All other components within normal limits   GLOMERULAR FILTRATION RATE, ESTIMATED - Abnormal; Notable for the following components:    Est, Glom Filt Rate 37 (*)     All other components within normal limits   COVID-19 & INFLUENZA COMBO   CULTURE, BLOOD 1   CULTURE, BLOOD 2   LIPASE   ANION GAP   OSMOLALITY   BRAIN NATRIURETIC PEPTIDE       EMERGENCY DEPARTMENT COURSE:   Vitals:    Vitals:    03/17/24 0812 03/17/24 0904 03/17/24 0939   BP: (!) 158/80  123/73   Pulse: 87 78 88   Resp: 20 22 20   Temp: 98 °F (36.7 °C)     SpO2: 96% 92% 91%   Weight: 93 kg (205 lb)     Height: 1.88 m (6' 2\")       Patient was assessed at bedside labs and imaging ordered.  Patient was given DuoNeb treatment and Robitussin DM and an albuterol inhaler here.  I reviewed the labs.  Patient does

## 2024-03-17 NOTE — DISCHARGE INSTR - COC
Infection Status       Infection Onset Added Last Indicated Last Indicated By Review Planned Expiration Resolved Resolved By    None active    Resolved    Influenza 24 Rapid influenza A/B antigens   24 Infection                        Nurse Assessment:  Last Vital Signs: /73   Pulse 88   Temp 98 °F (36.7 °C)   Resp 20   Ht 1.88 m (6' 2\")   Wt 93 kg (205 lb)   SpO2 91%   BMI 26.32 kg/m²     Last documented pain score (0-10 scale):    Last Weight:   Wt Readings from Last 1 Encounters:   24 93 kg (205 lb)     Mental Status:  {IP PT MENTAL STATUS:}    IV Access:  { MICAH IV ACCESS:978958544}    Nursing Mobility/ADLs:  Walking   {CHP DME ADLs:867963102}  Transfer  {CHP DME ADLs:753107565}  Bathing  {CHP DME ADLs:313320976}  Dressing  {CHP DME ADLs:635756715}  Toileting  {CHP DME ADLs:947137886}  Feeding  {CHP DME ADLs:042807491}  Med Admin  {CHP DME ADLs:324692263}  Med Delivery   { MICAH MED Delivery:326765170}    Wound Care Documentation and Therapy:  Incision 18 Foot Left (Active)   Number of days: 2180        Elimination:  Continence:   Bowel: {YES / NO:}  Bladder: {YES / NO:}  Urinary Catheter: {Urinary Catheter:970879264}   Colostomy/Ileostomy/Ileal Conduit: {YES / NO:}       Date of Last BM: ***  No intake or output data in the 24 hours ending 24 1106  No intake/output data recorded.    Safety Concerns:     { MICAH Safety Concerns:499651876}    Impairments/Disabilities:      { MICAH Impairments/Disabilities:380279452}    Nutrition Therapy:  Current Nutrition Therapy:   { MICAH Diet List:986571350}    Routes of Feeding: {CHP DME Other Feedings:989822130}  Liquids: {Slp liquid thickness:15572}  Daily Fluid Restriction: {CHP DME Yes amt example:210821032}  Last Modified Barium Swallow with Video (Video Swallowing Test): {Done Not Done Date:}    Treatments at the Time of Hospital Discharge:   Respiratory Treatments:

## 2024-03-18 LAB
AUTO DIFF PNL BLD: ABNORMAL
BASOPHILS ABSOLUTE: 0.1 THOU/MM3 (ref 0–0.1)
BASOPHILS NFR BLD AUTO: 0.8 %
DEPRECATED RDW RBC AUTO: 46.2 FL (ref 35–45)
EKG ATRIAL RATE: 83 BPM
EKG P AXIS: 40 DEGREES
EKG P-R INTERVAL: 152 MS
EKG Q-T INTERVAL: 376 MS
EKG QRS DURATION: 108 MS
EKG QTC CALCULATION (BAZETT): 441 MS
EKG R AXIS: -54 DEGREES
EKG T AXIS: 37 DEGREES
EKG VENTRICULAR RATE: 83 BPM
EOSINOPHIL NFR BLD AUTO: 4.1 %
EOSINOPHILS ABSOLUTE: 0.5 THOU/MM3 (ref 0–0.4)
ERYTHROCYTE [DISTWIDTH] IN BLOOD BY AUTOMATED COUNT: 14.6 % (ref 11.5–14.5)
HCT VFR BLD AUTO: 42.9 % (ref 42–52)
HGB BLD-MCNC: 14.3 GM/DL (ref 14–18)
IMM GRANULOCYTES # BLD AUTO: 0.78 THOU/MM3 (ref 0–0.07)
IMM GRANULOCYTES NFR BLD AUTO: 6.3 %
LYMPHOCYTES ABSOLUTE: 1.4 THOU/MM3 (ref 1–4.8)
LYMPHOCYTES NFR BLD AUTO: 11.3 %
MCH RBC QN AUTO: 28.8 PG (ref 26–33)
MCHC RBC AUTO-ENTMCNC: 33.3 GM/DL (ref 32.2–35.5)
MCV RBC AUTO: 86.3 FL (ref 80–94)
MONOCYTES ABSOLUTE: 1.3 THOU/MM3 (ref 0.4–1.3)
MONOCYTES NFR BLD AUTO: 10.7 %
NEUTROPHILS NFR BLD AUTO: 66.8 %
NRBC BLD AUTO-RTO: 0 /100 WBC
PATHOLOGIST REVIEW: ABNORMAL
PLATELET # BLD AUTO: 361 THOU/MM3 (ref 130–400)
PMV BLD AUTO: 9.6 FL (ref 9.4–12.4)
RBC # BLD AUTO: 4.97 MILL/MM3 (ref 4.7–6.1)
SEGMENTED NEUTROPHILS ABSOLUTE COUNT: 8.3 THOU/MM3 (ref 1.8–7.7)
WBC # BLD AUTO: 12.4 THOU/MM3 (ref 4.8–10.8)

## 2024-03-18 PROCEDURE — 93010 ELECTROCARDIOGRAM REPORT: CPT | Performed by: INTERNAL MEDICINE

## 2024-03-22 LAB
BACTERIA BLD AEROBE CULT: NORMAL
BACTERIA BLD AEROBE CULT: NORMAL

## (undated) DEVICE — IMPREGNATED GAUZE DRESSING: Brand: CUTICERIN 7.5X7.5CM CTN 50

## (undated) DEVICE — KIRSCHNER WIRE
Type: IMPLANTABLE DEVICE | Status: NON-FUNCTIONAL
Brand: TWINFIX
Removed: 2018-03-28

## (undated) DEVICE — Device

## (undated) DEVICE — THIN OFFSET (9.0 X 0.38 X 25.0MM)

## (undated) DEVICE — INTENDED FOR TISSUE SEPARATION, AND OTHER PROCEDURES THAT REQUIRE A SHARP SURGICAL BLADE TO PUNCTURE OR CUT.: Brand: BARD-PARKER ® CARBON RIB-BACK BLADES

## (undated) DEVICE — GLOVE ORANGE PI 8   MSG9080

## (undated) DEVICE — GLOVE ORANGE PI 7 1/2   MSG9075

## (undated) DEVICE — ANSPACH 3.5MMX8.4MM FLUT DR

## (undated) DEVICE — GLOVE SURG SZ 8 L11.77IN FNGR THK9.8MIL STRW LTX POLYMER

## (undated) DEVICE — PACK PROCEDURE SURG POD SC SRHP LF

## (undated) DEVICE — SOLUTION IRRIG 1500ML STRL H2O USP POUR PLAS BTL

## (undated) DEVICE — PREP SOL PVP IODINE 4%  4 OZ/BTL

## (undated) DEVICE — NEEDLE HYPO 25GA L1IN PIVOTING SHLD FOR LUERLOCK SYR ECLIPSE

## (undated) DEVICE — STANDARD DRILL BIT , AO

## (undated) DEVICE — CANNULATED DRILL: Brand: TWINFIX

## (undated) DEVICE — SOLUTION SURG PREP POV IOD 7.5% 4 OZ

## (undated) DEVICE — SOLUTION IV 1000ML 0.9% SOD CHL PH 5 INJ USP VIAFLX PLAS

## (undated) DEVICE — GOWN,SIRUS,NONRNF,SETINSLV,XL,20/CS: Brand: MEDLINE

## (undated) DEVICE — DRILL KIT 2 MM SPD

## (undated) DEVICE — SOLUTION IV IRRIG POUR BRL 0.9% SODIUM CHL 2F7124

## (undated) DEVICE — SUTURE NONABSORBABLE MONOFILAMENT 4-0 PS-2 18 IN BLU PROLENE 8682H

## (undated) DEVICE — GLOVE ORANGE PI 7   MSG9070

## (undated) DEVICE — GLOVE SURG SZ 8 L12IN FNGR THK94MIL TRNSLUC YEL LTX HYDRGEL

## (undated) DEVICE — Z DISCONTINUED BY MEDLINE USE 2711682 TRAY SKIN PREP DRY W/ PREM GLV